# Patient Record
Sex: FEMALE | Race: WHITE | NOT HISPANIC OR LATINO | Employment: OTHER | URBAN - NONMETROPOLITAN AREA
[De-identification: names, ages, dates, MRNs, and addresses within clinical notes are randomized per-mention and may not be internally consistent; named-entity substitution may affect disease eponyms.]

---

## 2017-01-18 ENCOUNTER — OFFICE VISIT (OUTPATIENT)
Dept: FAMILY MEDICINE CLINIC | Facility: CLINIC | Age: 42
End: 2017-01-18

## 2017-01-18 VITALS
OXYGEN SATURATION: 100 % | RESPIRATION RATE: 16 BRPM | HEIGHT: 60 IN | HEART RATE: 87 BPM | BODY MASS INDEX: 39.27 KG/M2 | TEMPERATURE: 98.6 F | DIASTOLIC BLOOD PRESSURE: 92 MMHG | SYSTOLIC BLOOD PRESSURE: 133 MMHG | WEIGHT: 200 LBS

## 2017-01-18 DIAGNOSIS — Z86.32 HX OF GESTATIONAL DIABETES MELLITUS, NOT CURRENTLY PREGNANT: ICD-10-CM

## 2017-01-18 DIAGNOSIS — R53.82 CHRONIC FATIGUE: ICD-10-CM

## 2017-01-18 DIAGNOSIS — D50.9 IRON DEFICIENCY ANEMIA, UNSPECIFIED IRON DEFICIENCY ANEMIA TYPE: Primary | ICD-10-CM

## 2017-01-18 DIAGNOSIS — Z13.1 ENCOUNTER FOR SCREENING EXAMINATION FOR IMPAIRED GLUCOSE REGULATION AND DIABETES MELLITUS: ICD-10-CM

## 2017-01-18 DIAGNOSIS — Z00.00 ROUTINE GENERAL MEDICAL EXAMINATION AT A HEALTH CARE FACILITY: ICD-10-CM

## 2017-01-18 PROCEDURE — 99213 OFFICE O/P EST LOW 20 MIN: CPT | Performed by: INTERNAL MEDICINE

## 2017-01-18 RX ORDER — FLUTICASONE PROPIONATE 50 MCG
SPRAY, SUSPENSION (ML) NASAL DAILY
COMMUNITY
Start: 2015-10-01 | End: 2021-04-22

## 2017-01-18 RX ORDER — CETIRIZINE HYDROCHLORIDE 10 MG/1
TABLET ORAL DAILY
COMMUNITY
Start: 2014-12-08 | End: 2021-04-22

## 2017-01-18 RX ORDER — MONTELUKAST SODIUM 10 MG/1
TABLET ORAL DAILY
COMMUNITY
Start: 2015-04-13 | End: 2019-07-22

## 2017-01-18 NOTE — MR AVS SNAPSHOT
Sunitha Gregory   2017 11:30 AM   Office Visit    Dept Phone:  [phone]   Encounter #:  [id number]    Provider:  Quinten Pal MD   Department:  [facility]                Your Full Care Plan              Your Updated Medication List          This list is accurate as of: 17 12:19 PM.  Always use your most recent med list.                cetirizine 10 MG tablet   Commonly known as:  zyrTEC       FLONASE 50 MCG/ACT nasal spray   Generic drug:  fluticasone       montelukast 10 MG tablet   Commonly known as:  SINGULAIR       venlafaxine  MG 24 hr capsule   Commonly known as:  EFFEXOR-XR   Take 1 capsule by mouth Daily.               You Were Diagnosed With        Codes Comments    Iron deficiency anemia, unspecified iron deficiency anemia type    -  Primary ICD-10-CM: D50.9  ICD-9-CM: 280.9     Chronic fatigue     ICD-10-CM: R53.82  ICD-9-CM: 780.79     Routine general medical examination at a health care facility     ICD-10-CM: Z00.00  ICD-9-CM: V70.0     Gestational diabetes mellitus (GDM), antepartum, gestational diabetes method of control unspecified     ICD-10-CM: O24.419  ICD-9-CM: 648.83       Instructions     None    Patient Instructions History      Upcoming Appointments     Visit Type Date Time Department    OFFICE VISIT 2017 11:30 AM [facility]    OFFICE VISIT 2017  3:15 PM [facility]      [employer] Signup     [facility] [employer] allows you to send messages to your doctor, view your test results, renew your prescriptions, schedule appointments, and more. To sign up, go to [employer] and click on the Sign Up Now link in the New User? box. Enter your [employer] Activation Code exactly as it appears below along with the last four digits of your Social Security Number and your Date of Birth () to complete the sign-up process. If you do not sign up before the expiration date, you must request a  "new code.    MyChart Activation Code: [email or web address]  Expires: 2/1/2017 12:18 PM    If you have questions, you can email [email or web address] or call [phone] to talk to our [employer] staff. Remember, MyChart is NOT to be used for urgent needs. For medical emergencies, dial 911.               Other Info from Your Visit           Your Appointments     Feb 22, 2017  3:15 PM EST   Office Visit with Quinten Pal MD   [facility] (--)    [address] [place] [id number] [phone]           Arrive 15 minutes prior to appointment.              Allergies     Other      Actifed Cold/Sinus TABS      Reason for Visit     Nasal Congestion x 5 to 6 days    Right Ear Pain x 2 days    Sore Throat x 5 days    Cough coughing x 5 days      Vital Signs     Blood Pressure Pulse Temperature Respirations Height Weight    133/92 (BP Location: Left arm, Patient Position: Sitting, Cuff Size: Large Adult) 87 98.6 °F (37 °C) 16 60\" (152.4 cm) 200 lb (90.7 kg)    Oxygen Saturation Body Mass Index Smoking Status             100% 39.06 kg/m2 Former Smoker         Problems and Diagnoses Noted     Iron deficiency anemia    -  Primary    Chronic fatigue        Routine medical exam        Diabetes mellitus arising in pregnancy          No Longer an Issue     Intrauterine pregnancy        "

## 2017-01-18 NOTE — PROGRESS NOTES
"Subjective   Patient ID: Sunitha Gregory is a 41 y.o. female Pt is here for management of multiple medical problems.  History of Present Illness  Pt is here for management of multiple medical problems.    Pain in right ear and sinus pressure and pain. + sick contact.  Hx of AR. On 3 meds for AR.  No muscinex.     Stopped breast feeding in Sept.     The following portions of the patient's history were reviewed and updated as appropriate: allergies, current medications, past family history, past medical history, past social history, past surgical history and problem list.  Review of Systems   Constitutional: Positive for fatigue.   HENT: Positive for congestion, sinus pressure and sore throat.    All other systems reviewed and are negative.      Objective     Visit Vitals   • /92 (BP Location: Left arm, Patient Position: Sitting, Cuff Size: Large Adult)   • Pulse 87   • Temp 98.6 °F (37 °C)   • Resp 16   • Ht 60\" (152.4 cm)   • Wt 200 lb (90.7 kg)   • SpO2 100%   • BMI 39.06 kg/m2     Physical Exam  General Appearance:    Alert, cooperative, no distress, appears stated age   Head:    Normocephalic, without obvious abnormality, atraumatic   Eyes:    PERRL, conjunctiva/corneas clear, EOM's intact           Ears:    Normal TM's and external ear canals, both ears   Nose:   Nares normal, septum midline, mucosa normal, no drainage    or sinus tenderness   Throat:   Lips, mucosa, and tongue normal; teeth and gums normal   Neck:   Supple, symmetrical, trachea midline, no adenopathy;        thyroid:  No enlargement/tenderness/nodules; no carotid    bruit or JVD   Back:     Symmetric, no curvature, ROM normal, no CVA tenderness   Lungs:     Clear to auscultation bilaterally, respirations unlabored   Chest wall:    No tenderness or deformity   Heart:    Regular rate and rhythm, S1 and S2 normal, no murmur, rub   or gallop   Abdomen:     Soft, non-tender, bowel sounds active all four quadrants,     no masses, no organomegaly "           Extremities:   Extremities normal, atraumatic, no cyanosis or edema   Pulses:   2+ and symmetric all extremities   Skin:   Skin color, texture, turgor normal, no rashes or lesions   Lymph nodes:   Cervical, supraclavicular, and axillary nodes normal   Neurologic:   CNII-XII intact. Normal strength, sensation and reflexes       throughout       Assessment/Plan           Sunitha was seen today for nasal congestion, right ear pain, sore throat and cough.    Diagnoses and all orders for this visit:    Iron deficiency anemia, unspecified iron deficiency anemia type  -     TSH; Future  -     T4, Free; Future  -     Vitamin B12; Future  -     Comprehensive Metabolic Panel; Future  -     CBC & Differential; Future  -     Lipid Panel; Future  -     Iron Profile; Future  -     Hemoglobin A1c; Future    Chronic fatigue  -     TSH; Future  -     T4, Free; Future  -     Vitamin B12; Future  -     Comprehensive Metabolic Panel; Future  -     CBC & Differential; Future  -     Lipid Panel; Future  -     Iron Profile; Future  -     Hemoglobin A1c; Future    Routine general medical examination at a health care facility  -     TSH; Future  -     T4, Free; Future  -     Vitamin B12; Future  -     Comprehensive Metabolic Panel; Future  -     CBC & Differential; Future  -     Lipid Panel; Future  -     Iron Profile; Future  -     Hemoglobin A1c; Future    Hx of gestational diabetes mellitus, not currently pregnant  -     TSH; Future  -     T4, Free; Future  -     Vitamin B12; Future  -     Comprehensive Metabolic Panel; Future  -     CBC & Differential; Future  -     Lipid Panel; Future  -     Iron Profile; Future  -     Hemoglobin A1c; Future    Encounter for screening examination for impaired glucose regulation and diabetes mellitus      Return in about 4 weeks (around 2/15/2017).                   There are no Patient Instructions on file for this visit.

## 2017-03-10 ENCOUNTER — TELEPHONE (OUTPATIENT)
Dept: FAMILY MEDICINE CLINIC | Facility: CLINIC | Age: 42
End: 2017-03-10

## 2017-03-10 RX ORDER — OSELTAMIVIR PHOSPHATE 75 MG/1
75 CAPSULE ORAL 2 TIMES DAILY
Qty: 10 CAPSULE | Refills: 0 | Status: SHIPPED | OUTPATIENT
Start: 2017-03-10 | End: 2019-06-06

## 2017-03-10 NOTE — TELEPHONE ENCOUNTER
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 Patient has not been tested, but feels fatigued. Patient does not get off work till 6 tonight. Patient wants to know if Tamiflu could be called for precaution. Patient stated she has a 18 month old she does not want to expose this too as well. Patient uses [facility]. Patient stated she is not allergic to anything that she is aware of. Patient will be in meeting all day so her  Sumanth can be reached at [phone].

## 2017-05-10 RX ORDER — VENLAFAXINE HYDROCHLORIDE 150 MG/1
CAPSULE, EXTENDED RELEASE ORAL
Qty: 30 CAPSULE | Refills: 1 | Status: SHIPPED | OUTPATIENT
Start: 2017-05-10 | End: 2019-06-06 | Stop reason: SDDI

## 2017-05-10 RX ORDER — VENLAFAXINE HYDROCHLORIDE 150 MG/1
150 CAPSULE, EXTENDED RELEASE ORAL DAILY
Qty: 90 CAPSULE | Refills: 9 | Status: SHIPPED | OUTPATIENT
Start: 2017-05-10 | End: 2019-06-06

## 2019-06-06 ENCOUNTER — TELEPHONE (OUTPATIENT)
Dept: INTERNAL MEDICINE | Facility: CLINIC | Age: 44
End: 2019-06-06

## 2019-06-06 ENCOUNTER — OFFICE VISIT (OUTPATIENT)
Dept: INTERNAL MEDICINE | Facility: CLINIC | Age: 44
End: 2019-06-06

## 2019-06-06 VITALS
BODY MASS INDEX: 37.18 KG/M2 | OXYGEN SATURATION: 98 % | HEART RATE: 78 BPM | SYSTOLIC BLOOD PRESSURE: 128 MMHG | WEIGHT: 189.4 LBS | HEIGHT: 60 IN | DIASTOLIC BLOOD PRESSURE: 68 MMHG | TEMPERATURE: 98.4 F

## 2019-06-06 DIAGNOSIS — J02.9 PHARYNGITIS, UNSPECIFIED ETIOLOGY: Primary | ICD-10-CM

## 2019-06-06 DIAGNOSIS — R05.9 COUGH: ICD-10-CM

## 2019-06-06 LAB
EXPIRATION DATE: NORMAL
INTERNAL CONTROL: NORMAL
Lab: NORMAL
S PYO RRNA THROAT QL PROBE: NEGATIVE

## 2019-06-06 PROCEDURE — 99213 OFFICE O/P EST LOW 20 MIN: CPT | Performed by: FAMILY MEDICINE

## 2019-06-06 PROCEDURE — 87651 STREP A DNA AMP PROBE: CPT | Performed by: FAMILY MEDICINE

## 2019-06-06 RX ORDER — AMOXICILLIN AND CLAVULANATE POTASSIUM 875; 125 MG/1; MG/1
1 TABLET, FILM COATED ORAL 2 TIMES DAILY
Qty: 20 TABLET | Refills: 0 | Status: SHIPPED | OUTPATIENT
Start: 2019-06-06 | End: 2019-07-22

## 2019-06-06 RX ORDER — MULTIVITAMIN
TABLET ORAL DAILY
COMMUNITY
Start: 2014-12-08

## 2019-06-06 RX ORDER — FLUCONAZOLE 150 MG/1
150 TABLET ORAL ONCE
Qty: 1 TABLET | Refills: 0 | Status: SHIPPED | OUTPATIENT
Start: 2019-06-06 | End: 2019-06-06

## 2019-06-06 NOTE — PROGRESS NOTES
Sunitha Gregory is a 43 y.o. female.    Chief Complaint   Patient presents with   • Cough     x 4 days    • URI       HPI   Patient reports they have not been feeling well for 4day(s). She admits to sore throat, cough.  She denies rhinorrhea, nasal congestion, facial pain, ear pain, headache.  They have tried alkaselzer, honey, bourbon, essential oils for this issue without good response.    The following portions of the patient's history were reviewed and updated as appropriate: allergies, current medications, past family history, past medical history, past social history, past surgical history and problem list.     Allergies   Allergen Reactions   • Other      Actifed Cold/Sinus TABS           Current Outpatient Medications:   •  cetirizine (zyrTEC) 10 MG tablet, Take  by mouth Daily., Disp: , Rfl:   •  fluticasone (FLONASE) 50 MCG/ACT nasal spray, into each nostril Daily., Disp: , Rfl:   •  Multiple Vitamin (MULTI-VITAMIN DAILY) tablet, Take  by mouth Daily., Disp: , Rfl:   •  amoxicillin-clavulanate (AUGMENTIN) 875-125 MG per tablet, Take 1 tablet by mouth 2 (Two) Times a Day., Disp: 20 tablet, Rfl: 0  •  montelukast (SINGULAIR) 10 MG tablet, Take  by mouth Daily., Disp: , Rfl:     ROS    Review of Systems   Constitutional: Negative for chills, fatigue and fever.   HENT: Positive for sore throat. Negative for congestion and postnasal drip.    Respiratory: Positive for choking. Negative for cough, shortness of breath and wheezing.    Cardiovascular: Negative for chest pain and leg swelling.   Gastrointestinal: Negative for abdominal pain, constipation, diarrhea, nausea and vomiting.   Musculoskeletal: Negative for arthralgias and back pain.   Neurological: Negative for headache.       Vitals:    06/06/19 1049   BP: 128/68   BP Location: Left arm   Patient Position: Sitting   Cuff Size: Adult   Pulse: 78   Temp: 98.4 °F (36.9 °C)   TempSrc: Temporal   SpO2: 98%   Weight: 85.9 kg (189 lb 6.4 oz)   Height: 152.4 cm  "(60\")     Body mass index is 36.99 kg/m².    Physical Exam     Physical Exam   Constitutional: She is oriented to person, place, and time. She appears well-developed and well-nourished. No distress.   HENT:   Head: Normocephalic and atraumatic.   Right Ear: Tympanic membrane and external ear normal.   Left Ear: Tympanic membrane and external ear normal.   Mouth/Throat: Posterior oropharyngeal erythema present.   Eyes: Conjunctivae and EOM are normal. Pupils are equal, round, and reactive to light.   Neck: Normal range of motion. Neck supple.   Cardiovascular: Normal rate and regular rhythm.   No murmur heard.  Pulmonary/Chest: Effort normal and breath sounds normal. No respiratory distress. She has no wheezes.   Abdominal: Soft. Bowel sounds are normal. She exhibits no distension. There is no tenderness.   Lymphadenopathy:     She has no cervical adenopathy.   Neurological: She is alert and oriented to person, place, and time. No cranial nerve deficit.   Skin: Skin is warm and dry.   Psychiatric: She has a normal mood and affect. Her behavior is normal.       Assessment/Plan    Problem List Items Addressed This Visit        Respiratory    Pharyngitis - Primary     Will treat with augmentin.  Patient encouraged to continue current allergy medications for cough.          Relevant Medications    amoxicillin-clavulanate (AUGMENTIN) 875-125 MG per tablet    Other Relevant Orders    POCT Strep A, molecular (Completed)      Other Visit Diagnoses     Cough        Relevant Orders    POCT Strep A, molecular (Completed)          New Medications Ordered This Visit   Medications   • amoxicillin-clavulanate (AUGMENTIN) 875-125 MG per tablet     Sig: Take 1 tablet by mouth 2 (Two) Times a Day.     Dispense:  20 tablet     Refill:  0   • fluconazole (DIFLUCAN) 150 MG tablet     Sig: Take 1 tablet by mouth 1 (One) Time for 1 dose.     Dispense:  1 tablet     Refill:  0       No orders of the defined types were placed in this " encounter.      Return if symptoms worsen or fail to improve.    Aliyah Cardoza, DO

## 2019-06-06 NOTE — TELEPHONE ENCOUNTER
Lab orders faxed to [facility] and patient has been notified. I left a message on her phone.

## 2019-06-06 NOTE — TELEPHONE ENCOUNTER
Patient has labs in system from 2017 but needs them to be reordered. She would like a call when that is done so she can come in and discuss with lexie.

## 2019-06-11 NOTE — ASSESSMENT & PLAN NOTE
Will treat with augmentin.  Patient encouraged to continue current allergy medications for cough.

## 2019-06-26 ENCOUNTER — TELEPHONE (OUTPATIENT)
Dept: INTERNAL MEDICINE | Facility: CLINIC | Age: 44
End: 2019-06-26

## 2019-06-26 RX ORDER — FLUCONAZOLE 100 MG/1
100 TABLET ORAL DAILY
Qty: 7 TABLET | Refills: 0 | Status: SHIPPED | OUTPATIENT
Start: 2019-06-26 | End: 2019-07-03

## 2019-06-26 NOTE — TELEPHONE ENCOUNTER
Dr. Cardoza, you saw this patient (Sunitha)  on 6/6/19 for Pharyngitis and prescribed  Augmentin and gave her Diflucan, patient states 5 days ago she developed a yeast infection, she states she took the Diflucan 3 days ago but the yeast infection has gotten much worse. Sunitha states she is miserable and needs something else prescribed for the yeast infection.

## 2019-07-11 LAB
ALBUMIN SERPL-MCNC: 4.1 G/DL (ref 3.5–5)
ALBUMIN/GLOB SERPL: 1.2 G/DL (ref 1–2)
ALP SERPL-CCNC: 74 U/L (ref 38–126)
ALT SERPL-CCNC: 29 U/L (ref 13–69)
AST SERPL-CCNC: 27 U/L (ref 15–46)
BASOPHILS # BLD AUTO: 0.05 10*3/MM3 (ref 0–0.2)
BASOPHILS NFR BLD AUTO: 0.8 % (ref 0–1.5)
BILIRUB SERPL-MCNC: 1 MG/DL (ref 0.2–1.3)
BUN SERPL-MCNC: 12 MG/DL (ref 7–20)
BUN/CREAT SERPL: 15 (ref 7.1–23.5)
CALCIUM SERPL-MCNC: 9.2 MG/DL (ref 8.4–10.2)
CHLORIDE SERPL-SCNC: 105 MMOL/L (ref 98–107)
CHOLEST SERPL-MCNC: 212 MG/DL (ref 0–199)
CO2 SERPL-SCNC: 23 MMOL/L (ref 26–30)
CREAT SERPL-MCNC: 0.8 MG/DL (ref 0.6–1.3)
EOSINOPHIL # BLD AUTO: 0.28 10*3/MM3 (ref 0–0.4)
EOSINOPHIL NFR BLD AUTO: 4.5 % (ref 0.3–6.2)
ERYTHROCYTE [DISTWIDTH] IN BLOOD BY AUTOMATED COUNT: 15.9 % (ref 12.3–15.4)
GLOBULIN SER CALC-MCNC: 3.4 GM/DL
GLUCOSE SERPL-MCNC: 85 MG/DL (ref 74–98)
HBA1C MFR BLD: 5.4 % (ref 4.8–5.6)
HCT VFR BLD AUTO: 37.5 % (ref 34–46.6)
HDLC SERPL-MCNC: 56 MG/DL (ref 40–60)
HGB BLD-MCNC: 11.7 G/DL (ref 12–15.9)
IMM GRANULOCYTES # BLD AUTO: 0.02 10*3/MM3 (ref 0–0.05)
IMM GRANULOCYTES NFR BLD AUTO: 0.3 % (ref 0–0.5)
IRON SATN MFR SERPL: 25 % (ref 11–46)
IRON SERPL-MCNC: 101 MCG/DL (ref 37–181)
LDLC SERPL CALC-MCNC: 136 MG/DL (ref 0–99)
LYMPHOCYTES # BLD AUTO: 1.78 10*3/MM3 (ref 0.7–3.1)
LYMPHOCYTES NFR BLD AUTO: 28.5 % (ref 19.6–45.3)
MCH RBC QN AUTO: 26.3 PG (ref 26.6–33)
MCHC RBC AUTO-ENTMCNC: 31.2 G/DL (ref 31.5–35.7)
MCV RBC AUTO: 84.3 FL (ref 79–97)
MONOCYTES # BLD AUTO: 0.64 10*3/MM3 (ref 0.1–0.9)
MONOCYTES NFR BLD AUTO: 10.3 % (ref 5–12)
NEUTROPHILS # BLD AUTO: 3.47 10*3/MM3 (ref 1.7–7)
NEUTROPHILS NFR BLD AUTO: 55.6 % (ref 42.7–76)
NRBC BLD AUTO-RTO: 0 /100 WBC (ref 0–0.2)
PLATELET # BLD AUTO: 413 10*3/MM3 (ref 140–450)
POTASSIUM SERPL-SCNC: 4.3 MMOL/L (ref 3.5–5.1)
PROT SERPL-MCNC: 7.5 G/DL (ref 6.3–8.2)
RBC # BLD AUTO: 4.45 10*6/MM3 (ref 3.77–5.28)
SODIUM SERPL-SCNC: 138 MMOL/L (ref 137–145)
T4 FREE SERPL-MCNC: 1.01 NG/DL (ref 0.78–2.19)
TIBC SERPL-MCNC: 408 MCG/DL (ref 261–497)
TRIGL SERPL-MCNC: 100 MG/DL
TSH SERPL DL<=0.005 MIU/L-ACNC: 3.95 MIU/ML (ref 0.47–4.68)
UIBC SERPL-MCNC: 308 MCG/DL (ref 112–346)
VIT B12 SERPL-MCNC: 472 PG/ML (ref 239–931)
VLDLC SERPL CALC-MCNC: 20 MG/DL
WBC # BLD AUTO: 6.24 10*3/MM3 (ref 3.4–10.8)

## 2019-07-22 ENCOUNTER — OFFICE VISIT (OUTPATIENT)
Dept: INTERNAL MEDICINE | Facility: CLINIC | Age: 44
End: 2019-07-22

## 2019-07-22 VITALS
SYSTOLIC BLOOD PRESSURE: 146 MMHG | OXYGEN SATURATION: 99 % | TEMPERATURE: 98.2 F | BODY MASS INDEX: 33.53 KG/M2 | HEIGHT: 60 IN | RESPIRATION RATE: 18 BRPM | HEART RATE: 91 BPM | WEIGHT: 170.8 LBS | DIASTOLIC BLOOD PRESSURE: 98 MMHG

## 2019-07-22 DIAGNOSIS — J45.30 MILD PERSISTENT REACTIVE AIRWAY DISEASE WITHOUT COMPLICATION: ICD-10-CM

## 2019-07-22 DIAGNOSIS — Z00.00 ROUTINE GENERAL MEDICAL EXAMINATION AT A HEALTH CARE FACILITY: ICD-10-CM

## 2019-07-22 DIAGNOSIS — I10 ESSENTIAL HYPERTENSION: Primary | ICD-10-CM

## 2019-07-22 PROCEDURE — 99396 PREV VISIT EST AGE 40-64: CPT | Performed by: INTERNAL MEDICINE

## 2019-07-22 RX ORDER — ALBUTEROL SULFATE 90 UG/1
2 AEROSOL, METERED RESPIRATORY (INHALATION) EVERY 4 HOURS PRN
Qty: 18 G | Refills: 1 | Status: SHIPPED | OUTPATIENT
Start: 2019-07-22 | End: 2020-08-25 | Stop reason: SDUPTHER

## 2019-07-22 RX ORDER — HYDROCHLOROTHIAZIDE 12.5 MG/1
12.5 TABLET ORAL DAILY
Qty: 30 TABLET | Refills: 11 | Status: SHIPPED | OUTPATIENT
Start: 2019-07-22 | End: 2020-08-03

## 2019-07-22 NOTE — PROGRESS NOTES
"Subjective     Patient ID: Sunitha Gregory is a 44 y.o. female. Patient is here for management of multiple medical problems.     Chief Complaint   Patient presents with   • Follow-up     4 MO F/U for Anxiety, Pharyngitis.     History of Present Illness       Annual exam today      Feels well.   Mild anxiety with MD office. No anxiety now and off Anxiety meds. Now on CBD oil.    RLS better with CBD cradum.  slepe is good at night  Down 35 lb in 1 year.   Eating better and exercise going well.  2 soda's in 6 months.              The following portions of the patient's history were reviewed and updated as appropriate: allergies, current medications, past family history, past medical history, past social history, past surgical history and problem list.    Review of Systems   Constitutional: Positive for fatigue.   Respiratory: Negative for shortness of breath, wheezing and stridor.    Cardiovascular: Negative for chest pain, palpitations and leg swelling.   Musculoskeletal: Negative for arthralgias, back pain and gait problem.   Psychiatric/Behavioral: Negative for sleep disturbance. The patient is not hyperactive.    All other systems reviewed and are negative.      Current Outpatient Medications:   •  cetirizine (zyrTEC) 10 MG tablet, Take  by mouth Daily., Disp: , Rfl:   •  fluticasone (FLONASE) 50 MCG/ACT nasal spray, into each nostril Daily., Disp: , Rfl:   •  Multiple Vitamin (MULTI-VITAMIN DAILY) tablet, Take  by mouth Daily., Disp: , Rfl:   •  albuterol sulfate  (90 Base) MCG/ACT inhaler, Inhale 2 puffs Every 4 (Four) Hours As Needed for Wheezing or Shortness of Air., Disp: 18 g, Rfl: 1  •  hydrochlorothiazide (HYDRODIURIL) 12.5 MG tablet, Take 1 tablet by mouth Daily., Disp: 30 tablet, Rfl: 11    Objective      Blood pressure 146/98, pulse 91, temperature 98.2 °F (36.8 °C), temperature source Temporal, resp. rate 18, height 152.4 cm (60\"), weight 77.5 kg (170 lb 12.8 oz), SpO2 99 %.    Physical " Exam     General Appearance:    Alert, cooperative, no distress, appears stated age   Head:    Normocephalic, without obvious abnormality, atraumatic   Eyes:    PERRL, conjunctiva/corneas clear, EOM's intact   Ears:    Normal TM's and external ear canals, both ears   Nose:   Nares normal, septum midline, mucosa normal, no drainage   or sinus tenderness   Throat:   Lips, mucosa, and tongue normal; teeth and gums normal   Neck:   Supple, symmetrical, trachea midline, no adenopathy;        thyroid:  No enlargement/tenderness/nodules; no carotid    bruit or JVD   Back:     Symmetric, no curvature, ROM normal, no CVA tenderness   Lungs:     Clear to auscultation bilaterally, respirations unlabored   Chest wall:    No tenderness or deformity   Heart:    Regular rate and rhythm, S1 and S2 normal, no murmur,        rub or gallop   Abdomen:     Soft, non-tender, bowel sounds active all four quadrants,     no masses, no organomegaly   Extremities:   Extremities normal, atraumatic, no cyanosis or edema   Pulses:   2+ and symmetric all extremities   Skin:   Skin color, texture, turgor normal, no rashes or lesions   Lymph nodes:   Cervical, supraclavicular, and axillary nodes normal   Neurologic:   CNII-XII intact. Normal strength, sensation and reflexes       throughout      Results for orders placed or performed in visit on 07/11/19   Comprehensive Metabolic Panel   Result Value Ref Range    Glucose 85 74 - 98 mg/dL    BUN 12 7 - 20 mg/dL    Creatinine 0.80 0.60 - 1.30 mg/dL    eGFR Non African Am 78 >60 mL/min/1.73    eGFR African Am 94 >60 mL/min/1.73    BUN/Creatinine Ratio 15.0 7.1 - 23.5    Sodium 138 137 - 145 mmol/L    Potassium 4.3 3.5 - 5.1 mmol/L    Chloride 105 98 - 107 mmol/L    Total CO2 23.0 (L) 26.0 - 30.0 mmol/L    Calcium 9.2 8.4 - 10.2 mg/dL    Total Protein 7.5 6.3 - 8.2 g/dL    Albumin 4.10 3.50 - 5.00 g/dL    Globulin 3.4 gm/dL    A/G Ratio 1.2 1.0 - 2.0 g/dL    Total Bilirubin 1.0 0.2 - 1.3 mg/dL     Alkaline Phosphatase 74 38 - 126 U/L    AST (SGOT) 27 15 - 46 U/L    ALT (SGPT) 29 13 - 69 U/L   Lipid Panel   Result Value Ref Range    Total Cholesterol 212 (H) 0 - 199 mg/dL    Triglycerides 100 <150 mg/dL    HDL Cholesterol 56 40 - 60 mg/dL    VLDL Cholesterol 20 mg/dL    LDL Cholesterol  136 (H) 0 - 99 mg/dL   Iron Profile   Result Value Ref Range    TIBC 408 261 - 497 mcg/dL    UIBC 308 112 - 346 mcg/dL    Iron 101 37 - 181 mcg/dL    Iron Saturation 25 11 - 46 %   Hemoglobin A1c   Result Value Ref Range    Hemoglobin A1C 5.40 4.80 - 5.60 %   T4, Free   Result Value Ref Range    Free T4 1.01 0.78 - 2.19 ng/dL   TSH   Result Value Ref Range    TSH 3.950 0.470 - 4.680 mIU/mL   Vitamin B12   Result Value Ref Range    Vitamin B-12 472 239 - 931 pg/mL   CBC & Differential   Result Value Ref Range    WBC 6.24 3.40 - 10.80 10*3/mm3    RBC 4.45 3.77 - 5.28 10*6/mm3    Hemoglobin 11.7 (L) 12.0 - 15.9 g/dL    Hematocrit 37.5 34.0 - 46.6 %    MCV 84.3 79.0 - 97.0 fL    MCH 26.3 (L) 26.6 - 33.0 pg    MCHC 31.2 (L) 31.5 - 35.7 g/dL    RDW 15.9 (H) 12.3 - 15.4 %    Platelets 413 140 - 450 10*3/mm3    Neutrophil Rel % 55.6 42.7 - 76.0 %    Lymphocyte Rel % 28.5 19.6 - 45.3 %    Monocyte Rel % 10.3 5.0 - 12.0 %    Eosinophil Rel % 4.5 0.3 - 6.2 %    Basophil Rel % 0.8 0.0 - 1.5 %    Neutrophils Absolute 3.47 1.70 - 7.00 10*3/mm3    Lymphocytes Absolute 1.78 0.70 - 3.10 10*3/mm3    Monocytes Absolute 0.64 0.10 - 0.90 10*3/mm3    Eosinophils Absolute 0.28 0.00 - 0.40 10*3/mm3    Basophils Absolute 0.05 0.00 - 0.20 10*3/mm3    Immature Granulocyte Rel % 0.3 0.0 - 0.5 %    Immature Grans Absolute 0.02 0.00 - 0.05 10*3/mm3    nRBC 0.0 0.0 - 0.2 /100 WBC         Assessment/Plan       RLS better with CBD cradum.  slepe is good at night  Down 35 lb in 1 year.   Eating better and exercise going well.  2 soda's in 6 months.    Sunitha was seen today for follow-up.    Diagnoses and all orders for this visit:    Essential hypertension  -      hydrochlorothiazide (HYDRODIURIL) 12.5 MG tablet; Take 1 tablet by mouth Daily.  -     Basic Metabolic Panel    Routine general medical examination at a health care facility  -     Ambulatory Referral to Gynecology    Mild persistent reactive airway disease without complication  -     albuterol sulfate  (90 Base) MCG/ACT inhaler; Inhale 2 puffs Every 4 (Four) Hours As Needed for Wheezing or Shortness of Air.      Return in about 3 months (around 10/22/2019).          There are no Patient Instructions on file for this visit.     Quinten Pal MD    Assessment/Plan

## 2019-09-05 ENCOUNTER — TRANSCRIBE ORDERS (OUTPATIENT)
Dept: MAMMOGRAPHY | Facility: HOSPITAL | Age: 44
End: 2019-09-05

## 2019-09-05 DIAGNOSIS — Z12.31 VISIT FOR SCREENING MAMMOGRAM: Primary | ICD-10-CM

## 2019-09-17 ENCOUNTER — HOSPITAL ENCOUNTER (OUTPATIENT)
Dept: MAMMOGRAPHY | Facility: HOSPITAL | Age: 44
Discharge: HOME | End: 2019-09-17
Admitting: OBSTETRICS & GYNECOLOGY

## 2019-09-17 DIAGNOSIS — Z12.31 VISIT FOR SCREENING MAMMOGRAM: ICD-10-CM

## 2019-09-17 PROCEDURE — 77063 BREAST TOMOSYNTHESIS BI: CPT | Performed by: RADIOLOGY

## 2019-09-17 PROCEDURE — 77067 SCR MAMMO BI INCL CAD: CPT | Performed by: RADIOLOGY

## 2019-09-17 PROCEDURE — 77063 BREAST TOMOSYNTHESIS BI: CPT

## 2019-09-17 PROCEDURE — 77067 SCR MAMMO BI INCL CAD: CPT

## 2019-11-15 LAB
BUN SERPL-MCNC: 14 MG/DL (ref 6–20)
BUN/CREAT SERPL: 17.7 (ref 7–25)
CALCIUM SERPL-MCNC: 9.3 MG/DL (ref 8.6–10.5)
CHLORIDE SERPL-SCNC: 101 MMOL/L (ref 98–107)
CO2 SERPL-SCNC: 27.7 MMOL/L (ref 22–29)
CREAT SERPL-MCNC: 0.79 MG/DL (ref 0.57–1)
GLUCOSE SERPL-MCNC: 92 MG/DL (ref 65–99)
POTASSIUM SERPL-SCNC: 4 MMOL/L (ref 3.5–5.2)
SODIUM SERPL-SCNC: 139 MMOL/L (ref 136–145)

## 2019-11-20 ENCOUNTER — OFFICE VISIT (OUTPATIENT)
Dept: INTERNAL MEDICINE | Facility: CLINIC | Age: 44
End: 2019-11-20

## 2019-11-20 VITALS
SYSTOLIC BLOOD PRESSURE: 118 MMHG | DIASTOLIC BLOOD PRESSURE: 78 MMHG | TEMPERATURE: 98.6 F | HEART RATE: 81 BPM | OXYGEN SATURATION: 100 % | BODY MASS INDEX: 32.61 KG/M2 | HEIGHT: 60 IN | RESPIRATION RATE: 16 BRPM | WEIGHT: 166.12 LBS

## 2019-11-20 DIAGNOSIS — I10 ESSENTIAL HYPERTENSION: Primary | ICD-10-CM

## 2019-11-20 DIAGNOSIS — Z00.00 ROUTINE GENERAL MEDICAL EXAMINATION AT A HEALTH CARE FACILITY: ICD-10-CM

## 2019-11-20 PROCEDURE — 90472 IMMUNIZATION ADMIN EACH ADD: CPT | Performed by: INTERNAL MEDICINE

## 2019-11-20 PROCEDURE — 90715 TDAP VACCINE 7 YRS/> IM: CPT | Performed by: INTERNAL MEDICINE

## 2019-11-20 PROCEDURE — 90471 IMMUNIZATION ADMIN: CPT | Performed by: INTERNAL MEDICINE

## 2019-11-20 PROCEDURE — 99213 OFFICE O/P EST LOW 20 MIN: CPT | Performed by: INTERNAL MEDICINE

## 2019-11-20 PROCEDURE — 90674 CCIIV4 VAC NO PRSV 0.5 ML IM: CPT | Performed by: INTERNAL MEDICINE

## 2020-01-14 LAB
ALBUMIN SERPL-MCNC: 4.3 G/DL (ref 3.5–5.2)
ALBUMIN/GLOB SERPL: 1.5 G/DL
ALP SERPL-CCNC: 73 U/L (ref 39–117)
ALT SERPL-CCNC: 19 U/L (ref 1–33)
AST SERPL-CCNC: 20 U/L (ref 1–32)
BASOPHILS # BLD AUTO: 0.04 10*3/MM3 (ref 0–0.2)
BASOPHILS NFR BLD AUTO: 0.6 % (ref 0–1.5)
BILIRUB SERPL-MCNC: 0.5 MG/DL (ref 0.2–1.2)
BUN SERPL-MCNC: 13 MG/DL (ref 6–20)
BUN/CREAT SERPL: 13.7 (ref 7–25)
CALCIUM SERPL-MCNC: 9.1 MG/DL (ref 8.6–10.5)
CHLORIDE SERPL-SCNC: 100 MMOL/L (ref 98–107)
CHOLEST SERPL-MCNC: 198 MG/DL (ref 0–200)
CO2 SERPL-SCNC: 26.4 MMOL/L (ref 22–29)
CREAT SERPL-MCNC: 0.95 MG/DL (ref 0.57–1)
EOSINOPHIL # BLD AUTO: 0.42 10*3/MM3 (ref 0–0.4)
EOSINOPHIL NFR BLD AUTO: 6.5 % (ref 0.3–6.2)
ERYTHROCYTE [DISTWIDTH] IN BLOOD BY AUTOMATED COUNT: 13.7 % (ref 12.3–15.4)
GLOBULIN SER CALC-MCNC: 2.8 GM/DL
GLUCOSE SERPL-MCNC: 86 MG/DL (ref 65–99)
HCT VFR BLD AUTO: 36.3 % (ref 34–46.6)
HDLC SERPL-MCNC: 62 MG/DL (ref 40–60)
HGB BLD-MCNC: 12.2 G/DL (ref 12–15.9)
IMM GRANULOCYTES # BLD AUTO: 0.01 10*3/MM3 (ref 0–0.05)
IMM GRANULOCYTES NFR BLD AUTO: 0.2 % (ref 0–0.5)
LDLC SERPL CALC-MCNC: 119 MG/DL (ref 0–100)
LYMPHOCYTES # BLD AUTO: 1.62 10*3/MM3 (ref 0.7–3.1)
LYMPHOCYTES NFR BLD AUTO: 24.9 % (ref 19.6–45.3)
MCH RBC QN AUTO: 29.5 PG (ref 26.6–33)
MCHC RBC AUTO-ENTMCNC: 33.6 G/DL (ref 31.5–35.7)
MCV RBC AUTO: 87.7 FL (ref 79–97)
MONOCYTES # BLD AUTO: 0.58 10*3/MM3 (ref 0.1–0.9)
MONOCYTES NFR BLD AUTO: 8.9 % (ref 5–12)
NEUTROPHILS # BLD AUTO: 3.84 10*3/MM3 (ref 1.7–7)
NEUTROPHILS NFR BLD AUTO: 58.9 % (ref 42.7–76)
NRBC BLD AUTO-RTO: 0 /100 WBC (ref 0–0.2)
PLATELET # BLD AUTO: 382 10*3/MM3 (ref 140–450)
POTASSIUM SERPL-SCNC: 4.1 MMOL/L (ref 3.5–5.2)
PROT SERPL-MCNC: 7.1 G/DL (ref 6–8.5)
RBC # BLD AUTO: 4.14 10*6/MM3 (ref 3.77–5.28)
SODIUM SERPL-SCNC: 140 MMOL/L (ref 136–145)
T4 FREE SERPL-MCNC: 1.13 NG/DL (ref 0.93–1.7)
TRIGL SERPL-MCNC: 86 MG/DL (ref 0–150)
TSH SERPL DL<=0.005 MIU/L-ACNC: 2.92 UIU/ML (ref 0.27–4.2)
VIT B12 SERPL-MCNC: 730 PG/ML (ref 211–946)
VLDLC SERPL CALC-MCNC: 17.2 MG/DL
WBC # BLD AUTO: 6.51 10*3/MM3 (ref 3.4–10.8)

## 2020-01-17 ENCOUNTER — OFFICE VISIT (OUTPATIENT)
Dept: INTERNAL MEDICINE | Facility: CLINIC | Age: 45
End: 2020-01-17

## 2020-01-17 VITALS
OXYGEN SATURATION: 100 % | HEIGHT: 60 IN | HEART RATE: 92 BPM | BODY MASS INDEX: 33.79 KG/M2 | RESPIRATION RATE: 16 BRPM | DIASTOLIC BLOOD PRESSURE: 85 MMHG | TEMPERATURE: 98.2 F | WEIGHT: 172.12 LBS | SYSTOLIC BLOOD PRESSURE: 131 MMHG

## 2020-01-17 DIAGNOSIS — R53.83 FATIGUE, UNSPECIFIED TYPE: Primary | ICD-10-CM

## 2020-01-17 DIAGNOSIS — F41.9 ANXIETY: ICD-10-CM

## 2020-01-17 PROCEDURE — 99214 OFFICE O/P EST MOD 30 MIN: CPT | Performed by: INTERNAL MEDICINE

## 2020-01-17 RX ORDER — BUSPIRONE HYDROCHLORIDE 5 MG/1
5 TABLET ORAL 3 TIMES DAILY
Qty: 60 TABLET | Refills: 3 | Status: SHIPPED | OUTPATIENT
Start: 2020-01-17 | End: 2020-01-17

## 2020-01-17 RX ORDER — BUPROPION HYDROCHLORIDE 100 MG/1
100 TABLET, EXTENDED RELEASE ORAL 2 TIMES DAILY
Qty: 60 TABLET | Refills: 7 | Status: SHIPPED | OUTPATIENT
Start: 2020-01-17 | End: 2020-02-14 | Stop reason: SDUPTHER

## 2020-01-17 NOTE — PROGRESS NOTES
Subjective     Patient ID: Sunitha Gregory is a 44 y.o. female. Patient is here for management of multiple medical problems.     Chief Complaint   Patient presents with   • Hypothyroidism     follow-up, patient noticing more hair loss, and more weight gain     Hypothyroidism   This is a chronic problem. The current episode started more than 1 year ago. The problem occurs constantly. The problem has been unchanged. Pertinent negatives include no abdominal pain, anorexia, arthralgias, change in bowel habit, chest pain or fatigue. Nothing aggravates the symptoms. The treatment provided moderate relief.      Wt up a bit. Not exercising like she had. Less food.        Anxiety. worsening    Poor sleep.     The following portions of the patient's history were reviewed and updated as appropriate: allergies, current medications, past family history, past medical history, past social history, past surgical history and problem list.    Review of Systems   Constitutional: Negative for fatigue.   Cardiovascular: Negative for chest pain.   Gastrointestinal: Negative for abdominal pain, anorexia and change in bowel habit.   Musculoskeletal: Negative for arthralgias.   All other systems reviewed and are negative.      Current Outpatient Medications:   •  albuterol sulfate  (90 Base) MCG/ACT inhaler, Inhale 2 puffs Every 4 (Four) Hours As Needed for Wheezing or Shortness of Air., Disp: 18 g, Rfl: 1  •  BIOTIN PO, Take  by mouth Daily., Disp: , Rfl:   •  cetirizine (zyrTEC) 10 MG tablet, Take  by mouth Daily., Disp: , Rfl:   •  fluticasone (FLONASE) 50 MCG/ACT nasal spray, into each nostril Daily., Disp: , Rfl:   •  hydrochlorothiazide (HYDRODIURIL) 12.5 MG tablet, Take 1 tablet by mouth Daily., Disp: 30 tablet, Rfl: 11  •  Multiple Vitamin (MULTI-VITAMIN DAILY) tablet, Take  by mouth Daily., Disp: , Rfl:   •  buPROPion SR (WELLBUTRIN SR) 100 MG 12 hr tablet, Take 1 tablet by mouth 2 (Two) Times a Day., Disp: 60 tablet, Rfl:  "7    Objective      Blood pressure 131/85, pulse 92, temperature 98.2 °F (36.8 °C), temperature source Oral, resp. rate 16, height 152.4 cm (60\"), weight 78.1 kg (172 lb 1.9 oz), SpO2 100 %.    Physical Exam     General Appearance:    Alert, cooperative, no distress, appears stated age   Head:    Normocephalic, without obvious abnormality, atraumatic   Eyes:    PERRL, conjunctiva/corneas clear, EOM's intact   Ears:    Normal TM's and external ear canals, both ears   Nose:   Nares normal, septum midline, mucosa normal, no drainage   or sinus tenderness   Throat:   Lips, mucosa, and tongue normal; teeth and gums normal   Neck:   Supple, symmetrical, trachea midline, no adenopathy;        thyroid:  No enlargement/tenderness/nodules; no carotid    bruit or JVD   Back:     Symmetric, no curvature, ROM normal, no CVA tenderness   Lungs:     Clear to auscultation bilaterally, respirations unlabored   Chest wall:    No tenderness or deformity   Heart:    Regular rate and rhythm, S1 and S2 normal, no murmur,        rub or gallop   Abdomen:     Soft, non-tender, bowel sounds active all four quadrants,     no masses, no organomegaly   Extremities:   Extremities normal, atraumatic, no cyanosis or edema   Pulses:   2+ and symmetric all extremities   Skin:   Skin color, texture, turgor normal, no rashes or lesions   Lymph nodes:   Cervical, supraclavicular, and axillary nodes normal   Neurologic:   CNII-XII intact. Normal strength, sensation and reflexes       throughout      Results for orders placed or performed in visit on 11/20/19   TSH   Result Value Ref Range    TSH 2.920 0.270 - 4.200 uIU/mL   T4, Free   Result Value Ref Range    Free T4 1.13 0.93 - 1.70 ng/dL   Lipid Panel   Result Value Ref Range    Total Cholesterol 198 0 - 200 mg/dL    Triglycerides 86 0 - 150 mg/dL    HDL Cholesterol 62 (H) 40 - 60 mg/dL    VLDL Cholesterol 17.2 mg/dL    LDL Cholesterol  119 (H) 0 - 100 mg/dL   Comprehensive Metabolic Panel   Result " Value Ref Range    Glucose 86 65 - 99 mg/dL    BUN 13 6 - 20 mg/dL    Creatinine 0.95 0.57 - 1.00 mg/dL    eGFR Non African Am 64 >60 mL/min/1.73    eGFR African Am 77 >60 mL/min/1.73    BUN/Creatinine Ratio 13.7 7.0 - 25.0    Sodium 140 136 - 145 mmol/L    Potassium 4.1 3.5 - 5.2 mmol/L    Chloride 100 98 - 107 mmol/L    Total CO2 26.4 22.0 - 29.0 mmol/L    Calcium 9.1 8.6 - 10.5 mg/dL    Total Protein 7.1 6.0 - 8.5 g/dL    Albumin 4.30 3.50 - 5.20 g/dL    Globulin 2.8 gm/dL    A/G Ratio 1.5 g/dL    Total Bilirubin 0.5 0.2 - 1.2 mg/dL    Alkaline Phosphatase 73 39 - 117 U/L    AST (SGOT) 20 1 - 32 U/L    ALT (SGPT) 19 1 - 33 U/L   Vitamin B12   Result Value Ref Range    Vitamin B-12 730 211 - 946 pg/mL   CBC & Differential   Result Value Ref Range    WBC 6.51 3.40 - 10.80 10*3/mm3    RBC 4.14 3.77 - 5.28 10*6/mm3    Hemoglobin 12.2 12.0 - 15.9 g/dL    Hematocrit 36.3 34.0 - 46.6 %    MCV 87.7 79.0 - 97.0 fL    MCH 29.5 26.6 - 33.0 pg    MCHC 33.6 31.5 - 35.7 g/dL    RDW 13.7 12.3 - 15.4 %    Platelets 382 140 - 450 10*3/mm3    Neutrophil Rel % 58.9 42.7 - 76.0 %    Lymphocyte Rel % 24.9 19.6 - 45.3 %    Monocyte Rel % 8.9 5.0 - 12.0 %    Eosinophil Rel % 6.5 (H) 0.3 - 6.2 %    Basophil Rel % 0.6 0.0 - 1.5 %    Neutrophils Absolute 3.84 1.70 - 7.00 10*3/mm3    Lymphocytes Absolute 1.62 0.70 - 3.10 10*3/mm3    Monocytes Absolute 0.58 0.10 - 0.90 10*3/mm3    Eosinophils Absolute 0.42 (H) 0.00 - 0.40 10*3/mm3    Basophils Absolute 0.04 0.00 - 0.20 10*3/mm3    Immature Granulocyte Rel % 0.2 0.0 - 0.5 %    Immature Grans Absolute 0.01 0.00 - 0.05 10*3/mm3    nRBC 0.0 0.0 - 0.2 /100 WBC         Assessment/Plan       Pt with fatigue.  Wt gain.  No constipation  Thinning hair.          Sunitha was seen today for hypothyroidism.    Diagnoses and all orders for this visit:    Fatigue, unspecified type    Anxiety    Other orders  -     Discontinue: busPIRone (BUSPAR) 5 MG tablet; Take 1 tablet by mouth 3 (Three) Times a Day.  -      buPROPion SR (WELLBUTRIN SR) 100 MG 12 hr tablet; Take 1 tablet by mouth 2 (Two) Times a Day.      Return in about 4 weeks (around 2/14/2020).          There are no Patient Instructions on file for this visit.     Quinten Pal MD    Assessment/Plan

## 2020-02-14 ENCOUNTER — OFFICE VISIT (OUTPATIENT)
Dept: INTERNAL MEDICINE | Facility: CLINIC | Age: 45
End: 2020-02-14

## 2020-02-14 VITALS
DIASTOLIC BLOOD PRESSURE: 86 MMHG | OXYGEN SATURATION: 99 % | HEART RATE: 81 BPM | TEMPERATURE: 98.4 F | WEIGHT: 173.8 LBS | RESPIRATION RATE: 16 BRPM | SYSTOLIC BLOOD PRESSURE: 139 MMHG | BODY MASS INDEX: 34.12 KG/M2 | HEIGHT: 60 IN

## 2020-02-14 DIAGNOSIS — F43.0 ACUTE STRESS DISORDER: ICD-10-CM

## 2020-02-14 DIAGNOSIS — J45.909 MODERATE ASTHMA, UNSPECIFIED WHETHER COMPLICATED, UNSPECIFIED WHETHER PERSISTENT: Primary | ICD-10-CM

## 2020-02-14 DIAGNOSIS — F41.9 ANXIETY: ICD-10-CM

## 2020-02-14 DIAGNOSIS — F32.89 OTHER DEPRESSION: ICD-10-CM

## 2020-02-14 PROCEDURE — 99214 OFFICE O/P EST MOD 30 MIN: CPT | Performed by: INTERNAL MEDICINE

## 2020-02-14 RX ORDER — BUPROPION HYDROCHLORIDE 150 MG/1
150 TABLET, EXTENDED RELEASE ORAL 2 TIMES DAILY
Qty: 60 TABLET | Refills: 11 | Status: SHIPPED | OUTPATIENT
Start: 2020-02-14 | End: 2021-02-23

## 2020-02-14 RX ORDER — LEVOTHYROXINE SODIUM 0.05 MG/1
50 TABLET ORAL DAILY
Qty: 30 TABLET | Refills: 11 | Status: SHIPPED | OUTPATIENT
Start: 2020-02-14 | End: 2021-02-17

## 2020-02-14 NOTE — PROGRESS NOTES
"Subjective     Patient ID: Sunitha Gregory is a 44 y.o. female. Patient is here for management of multiple medical problems.     Chief Complaint   Patient presents with   • Fatigue     follow-up, patient states the Wellbutrin is not working for her     History of Present Illness       Wellbutrin not working    Sleep apnea in question. Snoring. No sig fatigue.        The following portions of the patient's history were reviewed and updated as appropriate: allergies, current medications, past family history, past medical history, past social history, past surgical history and problem list.    Review of Systems   Constitutional: Negative for diaphoresis and fatigue.   Psychiatric/Behavioral: Positive for decreased concentration, dysphoric mood and sleep disturbance.   All other systems reviewed and are negative.      Current Outpatient Medications:   •  albuterol sulfate  (90 Base) MCG/ACT inhaler, Inhale 2 puffs Every 4 (Four) Hours As Needed for Wheezing or Shortness of Air., Disp: 18 g, Rfl: 1  •  BIOTIN PO, Take  by mouth Daily., Disp: , Rfl:   •  buPROPion SR (WELLBUTRIN SR) 150 MG 12 hr tablet, Take 1 tablet by mouth 2 (Two) Times a Day., Disp: 60 tablet, Rfl: 11  •  cetirizine (zyrTEC) 10 MG tablet, Take  by mouth Daily., Disp: , Rfl:   •  fluticasone (FLONASE) 50 MCG/ACT nasal spray, into each nostril Daily., Disp: , Rfl:   •  hydrochlorothiazide (HYDRODIURIL) 12.5 MG tablet, Take 1 tablet by mouth Daily., Disp: 30 tablet, Rfl: 11  •  Multiple Vitamin (MULTI-VITAMIN DAILY) tablet, Take  by mouth Daily., Disp: , Rfl:   •  levothyroxine (SYNTHROID) 50 MCG tablet, Take 1 tablet by mouth Daily., Disp: 30 tablet, Rfl: 11    Objective      Blood pressure 139/86, pulse 81, temperature 98.4 °F (36.9 °C), temperature source Oral, resp. rate 16, height 152.4 cm (60\"), weight 78.8 kg (173 lb 12.8 oz), SpO2 99 %.    Physical Exam     General Appearance:    Alert, cooperative, no distress, appears stated age   Head:   "  Normocephalic, without obvious abnormality, atraumatic   Eyes:    PERRL, conjunctiva/corneas clear, EOM's intact   Ears:    Normal TM's and external ear canals, both ears   Nose:   Nares normal, septum midline, mucosa normal, no drainage   or sinus tenderness   Throat:   Lips, mucosa, and tongue normal; teeth and gums normal   Neck:   Supple, symmetrical, trachea midline, no adenopathy;        thyroid:  No enlargement/tenderness/nodules; no carotid    bruit or JVD   Back:     Symmetric, no curvature, ROM normal, no CVA tenderness   Lungs:     Clear to auscultation bilaterally, respirations unlabored   Chest wall:    No tenderness or deformity   Heart:    Regular rate and rhythm, S1 and S2 normal, no murmur,        rub or gallop   Abdomen:     Soft, non-tender, bowel sounds active all four quadrants,     no masses, no organomegaly   Extremities:   Extremities normal, atraumatic, no cyanosis or edema   Pulses:   2+ and symmetric all extremities   Skin:   Skin color, texture, turgor normal, no rashes or lesions   Lymph nodes:   Cervical, supraclavicular, and axillary nodes normal   Neurologic:   CNII-XII intact. Normal strength, sensation and reflexes       throughout      Results for orders placed or performed in visit on 11/20/19   TSH   Result Value Ref Range    TSH 2.920 0.270 - 4.200 uIU/mL   T4, Free   Result Value Ref Range    Free T4 1.13 0.93 - 1.70 ng/dL   Lipid Panel   Result Value Ref Range    Total Cholesterol 198 0 - 200 mg/dL    Triglycerides 86 0 - 150 mg/dL    HDL Cholesterol 62 (H) 40 - 60 mg/dL    VLDL Cholesterol 17.2 mg/dL    LDL Cholesterol  119 (H) 0 - 100 mg/dL   Comprehensive Metabolic Panel   Result Value Ref Range    Glucose 86 65 - 99 mg/dL    BUN 13 6 - 20 mg/dL    Creatinine 0.95 0.57 - 1.00 mg/dL    eGFR Non African Am 64 >60 mL/min/1.73    eGFR African Am 77 >60 mL/min/1.73    BUN/Creatinine Ratio 13.7 7.0 - 25.0    Sodium 140 136 - 145 mmol/L    Potassium 4.1 3.5 - 5.2 mmol/L     Chloride 100 98 - 107 mmol/L    Total CO2 26.4 22.0 - 29.0 mmol/L    Calcium 9.1 8.6 - 10.5 mg/dL    Total Protein 7.1 6.0 - 8.5 g/dL    Albumin 4.30 3.50 - 5.20 g/dL    Globulin 2.8 gm/dL    A/G Ratio 1.5 g/dL    Total Bilirubin 0.5 0.2 - 1.2 mg/dL    Alkaline Phosphatase 73 39 - 117 U/L    AST (SGOT) 20 1 - 32 U/L    ALT (SGPT) 19 1 - 33 U/L   Vitamin B12   Result Value Ref Range    Vitamin B-12 730 211 - 946 pg/mL   CBC & Differential   Result Value Ref Range    WBC 6.51 3.40 - 10.80 10*3/mm3    RBC 4.14 3.77 - 5.28 10*6/mm3    Hemoglobin 12.2 12.0 - 15.9 g/dL    Hematocrit 36.3 34.0 - 46.6 %    MCV 87.7 79.0 - 97.0 fL    MCH 29.5 26.6 - 33.0 pg    MCHC 33.6 31.5 - 35.7 g/dL    RDW 13.7 12.3 - 15.4 %    Platelets 382 140 - 450 10*3/mm3    Neutrophil Rel % 58.9 42.7 - 76.0 %    Lymphocyte Rel % 24.9 19.6 - 45.3 %    Monocyte Rel % 8.9 5.0 - 12.0 %    Eosinophil Rel % 6.5 (H) 0.3 - 6.2 %    Basophil Rel % 0.6 0.0 - 1.5 %    Neutrophils Absolute 3.84 1.70 - 7.00 10*3/mm3    Lymphocytes Absolute 1.62 0.70 - 3.10 10*3/mm3    Monocytes Absolute 0.58 0.10 - 0.90 10*3/mm3    Eosinophils Absolute 0.42 (H) 0.00 - 0.40 10*3/mm3    Basophils Absolute 0.04 0.00 - 0.20 10*3/mm3    Immature Granulocyte Rel % 0.2 0.0 - 0.5 %    Immature Grans Absolute 0.01 0.00 - 0.05 10*3/mm3    nRBC 0.0 0.0 - 0.2 /100 WBC         Assessment/Plan       Sunitha was seen today for fatigue.    Diagnoses and all orders for this visit:    Moderate asthma, unspecified whether complicated, unspecified whether persistent  -     Overnight Sleep Oximetry Study; Future    Other depression    Acute stress disorder    Anxiety  -     buPROPion SR (WELLBUTRIN SR) 150 MG 12 hr tablet; Take 1 tablet by mouth 2 (Two) Times a Day.    Other orders  -     levothyroxine (SYNTHROID) 50 MCG tablet; Take 1 tablet by mouth Daily.      Return in about 6 weeks (around 3/27/2020).          There are no Patient Instructions on file for this visit.     Quinten Pal,  MD    Assessment/Plan

## 2020-06-03 ENCOUNTER — TELEPHONE (OUTPATIENT)
Dept: INTERNAL MEDICINE | Facility: CLINIC | Age: 45
End: 2020-06-03

## 2020-06-03 NOTE — TELEPHONE ENCOUNTER
PATIENT DIDN'T FEEL COMFORTABLE COMING IN AT THIS TIME AND WOULD BE INTERESTED IN A PHONE VISIT IF DR. TAVERAS IS DOING THOSE, PLEASE CALL PATIENT AND ADVISE    CAROL PH: [phone]

## 2020-07-09 ENCOUNTER — TELEPHONE (OUTPATIENT)
Dept: INTERNAL MEDICINE | Facility: CLINIC | Age: 45
End: 2020-07-09

## 2020-07-09 NOTE — TELEPHONE ENCOUNTER
PT CALLED AND WANTED TO KNOW IF DR TAVERAS CAN PLACE LAB ORDERS IN HER CHART FOR HER THYROID AND CHOLESTEROL LEVELS; HER APPT ISNT UNTIL AUGUST; PLEASE ADVISE    CAROL: [phone]

## 2020-07-13 DIAGNOSIS — Z00.00 ROUTINE GENERAL MEDICAL EXAMINATION AT A HEALTH CARE FACILITY: ICD-10-CM

## 2020-07-13 DIAGNOSIS — E78.00 HYPERCHOLESTEREMIA: Primary | ICD-10-CM

## 2020-08-03 DIAGNOSIS — I10 ESSENTIAL HYPERTENSION: ICD-10-CM

## 2020-08-03 RX ORDER — HYDROCHLOROTHIAZIDE 12.5 MG/1
TABLET ORAL
Qty: 30 TABLET | Refills: 10 | Status: SHIPPED | OUTPATIENT
Start: 2020-08-03 | End: 2021-05-03

## 2020-08-19 ENCOUNTER — TELEPHONE (OUTPATIENT)
Dept: INTERNAL MEDICINE | Facility: CLINIC | Age: 45
End: 2020-08-19

## 2020-08-19 NOTE — TELEPHONE ENCOUNTER
Patient requested a call back. Patient stated she is going to have lab work done on 8/20/20 for Dr. Pal and would like to make sure that it includes a thyroid check.     Please call and advise. Patient call back [phone]

## 2020-08-21 LAB
ALBUMIN SERPL-MCNC: 4.6 G/DL (ref 3.5–5.2)
ALBUMIN/GLOB SERPL: 2.2 G/DL
ALP SERPL-CCNC: 59 U/L (ref 39–117)
ALT SERPL-CCNC: 19 U/L (ref 1–33)
AST SERPL-CCNC: 18 U/L (ref 1–32)
BASOPHILS # BLD AUTO: 0.05 10*3/MM3 (ref 0–0.2)
BASOPHILS NFR BLD AUTO: 0.8 % (ref 0–1.5)
BILIRUB SERPL-MCNC: 0.7 MG/DL (ref 0–1.2)
BUN SERPL-MCNC: 15 MG/DL (ref 6–20)
BUN/CREAT SERPL: 18.3 (ref 7–25)
CALCIUM SERPL-MCNC: 9 MG/DL (ref 8.6–10.5)
CHLORIDE SERPL-SCNC: 102 MMOL/L (ref 98–107)
CHOLEST SERPL-MCNC: 232 MG/DL (ref 0–200)
CO2 SERPL-SCNC: 25.9 MMOL/L (ref 22–29)
CREAT SERPL-MCNC: 0.82 MG/DL (ref 0.57–1)
EOSINOPHIL # BLD AUTO: 0.4 10*3/MM3 (ref 0–0.4)
EOSINOPHIL NFR BLD AUTO: 6.7 % (ref 0.3–6.2)
ERYTHROCYTE [DISTWIDTH] IN BLOOD BY AUTOMATED COUNT: 15.9 % (ref 12.3–15.4)
GLOBULIN SER CALC-MCNC: 2.1 GM/DL
GLUCOSE SERPL-MCNC: 92 MG/DL (ref 65–99)
HCT VFR BLD AUTO: 38.3 % (ref 34–46.6)
HDLC SERPL-MCNC: 72 MG/DL (ref 40–60)
HGB BLD-MCNC: 12.3 G/DL (ref 12–15.9)
IMM GRANULOCYTES # BLD AUTO: 0.01 10*3/MM3 (ref 0–0.05)
IMM GRANULOCYTES NFR BLD AUTO: 0.2 % (ref 0–0.5)
LDLC SERPL CALC-MCNC: 145 MG/DL (ref 0–100)
LYMPHOCYTES # BLD AUTO: 1.34 10*3/MM3 (ref 0.7–3.1)
LYMPHOCYTES NFR BLD AUTO: 22.4 % (ref 19.6–45.3)
MCH RBC QN AUTO: 29 PG (ref 26.6–33)
MCHC RBC AUTO-ENTMCNC: 32.1 G/DL (ref 31.5–35.7)
MCV RBC AUTO: 90.3 FL (ref 79–97)
MONOCYTES # BLD AUTO: 0.67 10*3/MM3 (ref 0.1–0.9)
MONOCYTES NFR BLD AUTO: 11.2 % (ref 5–12)
NEUTROPHILS # BLD AUTO: 3.52 10*3/MM3 (ref 1.7–7)
NEUTROPHILS NFR BLD AUTO: 58.7 % (ref 42.7–76)
NRBC BLD AUTO-RTO: 0 /100 WBC (ref 0–0.2)
PLATELET # BLD AUTO: 313 10*3/MM3 (ref 140–450)
POTASSIUM SERPL-SCNC: 3.9 MMOL/L (ref 3.5–5.2)
PROT SERPL-MCNC: 6.7 G/DL (ref 6–8.5)
RBC # BLD AUTO: 4.24 10*6/MM3 (ref 3.77–5.28)
SODIUM SERPL-SCNC: 137 MMOL/L (ref 136–145)
T4 FREE SERPL-MCNC: 1.54 NG/DL (ref 0.93–1.7)
TRIGL SERPL-MCNC: 74 MG/DL (ref 0–150)
TSH SERPL DL<=0.005 MIU/L-ACNC: 1.66 UIU/ML (ref 0.27–4.2)
VIT B12 SERPL-MCNC: 674 PG/ML (ref 211–946)
VLDLC SERPL CALC-MCNC: 14.8 MG/DL
WBC # BLD AUTO: 5.99 10*3/MM3 (ref 3.4–10.8)

## 2020-08-25 ENCOUNTER — OFFICE VISIT (OUTPATIENT)
Dept: INTERNAL MEDICINE | Facility: CLINIC | Age: 45
End: 2020-08-25

## 2020-08-25 VITALS
HEART RATE: 74 BPM | HEIGHT: 60 IN | WEIGHT: 159 LBS | SYSTOLIC BLOOD PRESSURE: 137 MMHG | BODY MASS INDEX: 31.22 KG/M2 | TEMPERATURE: 99.6 F | DIASTOLIC BLOOD PRESSURE: 85 MMHG | OXYGEN SATURATION: 100 %

## 2020-08-25 DIAGNOSIS — E03.9 ACQUIRED HYPOTHYROIDISM: ICD-10-CM

## 2020-08-25 DIAGNOSIS — J45.30 MILD PERSISTENT REACTIVE AIRWAY DISEASE WITHOUT COMPLICATION: ICD-10-CM

## 2020-08-25 DIAGNOSIS — F41.9 ANXIETY: ICD-10-CM

## 2020-08-25 DIAGNOSIS — R53.83 FATIGUE, UNSPECIFIED TYPE: Primary | ICD-10-CM

## 2020-08-25 PROCEDURE — 99213 OFFICE O/P EST LOW 20 MIN: CPT | Performed by: INTERNAL MEDICINE

## 2020-08-25 RX ORDER — ALBUTEROL SULFATE 90 UG/1
2 AEROSOL, METERED RESPIRATORY (INHALATION) EVERY 4 HOURS PRN
Qty: 18 G | Refills: 1 | Status: SHIPPED | OUTPATIENT
Start: 2020-08-25

## 2020-08-25 RX ORDER — ESCITALOPRAM OXALATE 5 MG/1
5 TABLET ORAL DAILY
Qty: 90 TABLET | Refills: 3 | Status: SHIPPED | OUTPATIENT
Start: 2020-08-25 | End: 2021-04-22

## 2020-08-25 NOTE — PROGRESS NOTES
Subjective     Patient ID: Sunitha Gregory is a 45 y.o. female. Patient is here for management of multiple medical problems.     Chief Complaint   Patient presents with   • Follow-up     on thyroid pt states her hair has been falling out more     History of Present Illness       Hair is falling out more.      Wt loss going ok then platue.  Last fill on thyroid med last friday and pt has  Been feeling bad x 2 months.  Still exercising. Wt going up and no decrease in     The following portions of the patient's history were reviewed and updated as appropriate: allergies, current medications, past family history, past medical history, past social history, past surgical history and problem list.    Review of Systems   Constitutional: Positive for fatigue.   HENT: Negative for congestion, sneezing and sore throat.    Gastrointestinal: Negative for blood in stool, constipation and diarrhea.   Musculoskeletal: Negative for gait problem, joint swelling and myalgias.   Psychiatric/Behavioral: Negative for self-injury and sleep disturbance. The patient is not nervous/anxious.    All other systems reviewed and are negative.      Current Outpatient Medications:   •  albuterol sulfate  (90 Base) MCG/ACT inhaler, Inhale 2 puffs Every 4 (Four) Hours As Needed for Wheezing or Shortness of Air., Disp: 18 g, Rfl: 1  •  BIOTIN PO, Take  by mouth Daily., Disp: , Rfl:   •  buPROPion SR (WELLBUTRIN SR) 150 MG 12 hr tablet, Take 1 tablet by mouth 2 (Two) Times a Day., Disp: 60 tablet, Rfl: 11  •  cetirizine (zyrTEC) 10 MG tablet, Take  by mouth Daily., Disp: , Rfl:   •  fluticasone (FLONASE) 50 MCG/ACT nasal spray, into each nostril Daily., Disp: , Rfl:   •  hydroCHLOROthiazide (HYDRODIURIL) 12.5 MG tablet, TAKE ONE TABLET BY MOUTH DAILY, Disp: 30 tablet, Rfl: 10  •  levothyroxine (SYNTHROID) 50 MCG tablet, Take 1 tablet by mouth Daily., Disp: 30 tablet, Rfl: 11  •  Multiple Vitamin (MULTI-VITAMIN DAILY) tablet, Take  by mouth  "Daily., Disp: , Rfl:   •  escitalopram (LEXAPRO) 5 MG tablet, Take 1 tablet by mouth Daily., Disp: 90 tablet, Rfl: 3    Objective      Blood pressure 137/85, pulse 74, temperature 99.6 °F (37.6 °C), temperature source Temporal, height 152.4 cm (60\"), weight 72.1 kg (159 lb), SpO2 100 %.    Physical Exam     General Appearance:    Alert, cooperative, no distress, appears stated age   Head:    Normocephalic, without obvious abnormality, atraumatic   Eyes:    PERRL, conjunctiva/corneas clear, EOM's intact   Ears:    Normal TM's and external ear canals, both ears   Nose:   Nares normal, septum midline, mucosa normal, no drainage   or sinus tenderness   Throat:   Lips, mucosa, and tongue normal; teeth and gums normal   Neck:   Supple, symmetrical, trachea midline, no adenopathy;        thyroid:  No enlargement/tenderness/nodules; no carotid    bruit or JVD   Back:     Symmetric, no curvature, ROM normal, no CVA tenderness   Lungs:     Clear to auscultation bilaterally, respirations unlabored   Chest wall:    No tenderness or deformity   Heart:    Regular rate and rhythm, S1 and S2 normal, no murmur,        rub or gallop   Abdomen:     Soft, non-tender, bowel sounds active all four quadrants,     no masses, no organomegaly   Extremities:   Extremities normal, atraumatic, no cyanosis or edema   Pulses:   2+ and symmetric all extremities   Skin:   Skin color, texture, turgor normal, no rashes or lesions   Lymph nodes:   Cervical, supraclavicular, and axillary nodes normal   Neurologic:   CNII-XII intact. Normal strength, sensation and reflexes       throughout      Results for orders placed or performed in visit on 07/13/20   TSH   Result Value Ref Range    TSH 1.660 0.270 - 4.200 uIU/mL   T4, Free   Result Value Ref Range    Free T4 1.54 0.93 - 1.70 ng/dL   Comprehensive Metabolic Panel   Result Value Ref Range    Glucose 92 65 - 99 mg/dL    BUN 15 6 - 20 mg/dL    Creatinine 0.82 0.57 - 1.00 mg/dL    eGFR Non African Am 75 " >60 mL/min/1.73    eGFR African Am 91 >60 mL/min/1.73    BUN/Creatinine Ratio 18.3 7.0 - 25.0    Sodium 137 136 - 145 mmol/L    Potassium 3.9 3.5 - 5.2 mmol/L    Chloride 102 98 - 107 mmol/L    Total CO2 25.9 22.0 - 29.0 mmol/L    Calcium 9.0 8.6 - 10.5 mg/dL    Total Protein 6.7 6.0 - 8.5 g/dL    Albumin 4.60 3.50 - 5.20 g/dL    Globulin 2.1 gm/dL    A/G Ratio 2.2 g/dL    Total Bilirubin 0.7 0.0 - 1.2 mg/dL    Alkaline Phosphatase 59 39 - 117 U/L    AST (SGOT) 18 1 - 32 U/L    ALT (SGPT) 19 1 - 33 U/L   Vitamin B12   Result Value Ref Range    Vitamin B-12 674 211 - 946 pg/mL   Lipid Panel   Result Value Ref Range    Total Cholesterol 232 (H) 0 - 200 mg/dL    Triglycerides 74 0 - 150 mg/dL    HDL Cholesterol 72 (H) 40 - 60 mg/dL    VLDL Cholesterol 14.8 mg/dL    LDL Cholesterol  145 (H) 0 - 100 mg/dL   CBC & Differential   Result Value Ref Range    WBC 5.99 3.40 - 10.80 10*3/mm3    RBC 4.24 3.77 - 5.28 10*6/mm3    Hemoglobin 12.3 12.0 - 15.9 g/dL    Hematocrit 38.3 34.0 - 46.6 %    MCV 90.3 79.0 - 97.0 fL    MCH 29.0 26.6 - 33.0 pg    MCHC 32.1 31.5 - 35.7 g/dL    RDW 15.9 (H) 12.3 - 15.4 %    Platelets 313 140 - 450 10*3/mm3    Neutrophil Rel % 58.7 42.7 - 76.0 %    Lymphocyte Rel % 22.4 19.6 - 45.3 %    Monocyte Rel % 11.2 5.0 - 12.0 %    Eosinophil Rel % 6.7 (H) 0.3 - 6.2 %    Basophil Rel % 0.8 0.0 - 1.5 %    Neutrophils Absolute 3.52 1.70 - 7.00 10*3/mm3    Lymphocytes Absolute 1.34 0.70 - 3.10 10*3/mm3    Monocytes Absolute 0.67 0.10 - 0.90 10*3/mm3    Eosinophils Absolute 0.40 0.00 - 0.40 10*3/mm3    Basophils Absolute 0.05 0.00 - 0.20 10*3/mm3    Immature Granulocyte Rel % 0.2 0.0 - 0.5 %    Immature Grans Absolute 0.01 0.00 - 0.05 10*3/mm3    nRBC 0.0 0.0 - 0.2 /100 WBC         Assessment/Plan   Pt with increasing panic attracts. Had to shut the new and Francis off. Now some better.        Sunitha was seen today for follow-up.    Diagnoses and all orders for this visit:    Fatigue, unspecified type    Mild  persistent reactive airway disease without complication  -     albuterol sulfate  (90 Base) MCG/ACT inhaler; Inhale 2 puffs Every 4 (Four) Hours As Needed for Wheezing or Shortness of Air.    Acquired hypothyroidism    Anxiety  -     escitalopram (LEXAPRO) 5 MG tablet; Take 1 tablet by mouth Daily.      Return in about 6 weeks (around 10/6/2020).          There are no Patient Instructions on file for this visit.     Quinten Pal MD    Assessment/Plan

## 2020-09-09 PROBLEM — Z98.891 HISTORY OF CESAREAN DELIVERY: Status: ACTIVE | Noted: 2020-09-09

## 2020-09-09 PROBLEM — Z98.51 HISTORY OF BILATERAL TUBAL LIGATION: Status: ACTIVE | Noted: 2020-09-09

## 2020-09-09 PROBLEM — E66.3 OVERWEIGHT: Status: ACTIVE | Noted: 2020-09-09

## 2020-09-10 ENCOUNTER — OFFICE VISIT (OUTPATIENT)
Dept: OBSTETRICS AND GYNECOLOGY | Facility: CLINIC | Age: 45
End: 2020-09-10

## 2020-09-10 VITALS
DIASTOLIC BLOOD PRESSURE: 82 MMHG | BODY MASS INDEX: 30.63 KG/M2 | SYSTOLIC BLOOD PRESSURE: 118 MMHG | HEIGHT: 60 IN | WEIGHT: 156 LBS

## 2020-09-10 DIAGNOSIS — E66.09 CLASS 1 OBESITY DUE TO EXCESS CALORIES WITHOUT SERIOUS COMORBIDITY WITH BODY MASS INDEX (BMI) OF 30.0 TO 30.9 IN ADULT: ICD-10-CM

## 2020-09-10 DIAGNOSIS — Z12.31 BREAST CANCER SCREENING BY MAMMOGRAM: ICD-10-CM

## 2020-09-10 DIAGNOSIS — Z01.419 WOMEN'S ANNUAL ROUTINE GYNECOLOGICAL EXAMINATION: Primary | ICD-10-CM

## 2020-09-10 DIAGNOSIS — Z98.51 HISTORY OF BILATERAL TUBAL LIGATION: ICD-10-CM

## 2020-09-10 DIAGNOSIS — Z98.891 HISTORY OF CESAREAN DELIVERY: ICD-10-CM

## 2020-09-10 PROBLEM — E66.9 CLASS 1 OBESITY IN ADULT: Status: ACTIVE | Noted: 2020-09-09

## 2020-09-10 PROBLEM — E66.811 CLASS 1 OBESITY IN ADULT: Status: ACTIVE | Noted: 2020-09-09

## 2020-09-10 PROCEDURE — 99396 PREV VISIT EST AGE 40-64: CPT | Performed by: OBSTETRICS & GYNECOLOGY

## 2020-09-10 NOTE — PROGRESS NOTES
GYN Annual Exam     CC - Here for annual exam.     Subjective   HPI  Sunitha Gregory is a 45 y.o. female, , who presents for annual well woman exam. Patient's last menstrual period was 2020 (exact date)..  Periods are regular every 25-35 days, lasting 4 days.  Dysmenorrhea:moderate, occurring every other month.  Patient reports problems with: none.  Partner Status: Marital Status: .  New Partners since last visit: no.  Desires STD Screening: no.    Additional OB/GYN History   Current contraception: contraceptive methods: Tubal ligation  Desires to: do not start contraception  Last Pap :   Last Completed Pap Smear       Status Date      PAP SMEAR Done 2019 Dr Haile        History of abnormal Pap smear: no  Family history of uterine, colon, breast, or ovarian cancer: no  Performs monthly Self-Breast Exam: yes  Last mammogram:   Last Completed Mammogram       Status Date      MAMMOGRAM Done 2019 MAMMO SCREENING DIGITAL TOMOSYNTHESIS BILATERAL W CAD     Patient has more history with this topic...         Exercises Regularly: no  Feelings of Anxiety or Depression: yes - managed with lexapro and wellbutrin  Tobacco Usage?: No   OB History        3    Para   3    Term   3            AB        Living           SAB        TAB        Ectopic        Molar        Multiple        Live Births                    Health Maintenance   Topic Date Due   • Annual Gynecologic Pelvic and Breast Exam  1974   • HEPATITIS C SCREENING  2016   • COLONOSCOPY  2016   • ANNUAL PHYSICAL  2020   • INFLUENZA VACCINE  2020   • MAMMOGRAM  2020   • LIPID PANEL  2021   • PAP SMEAR  2022   • TDAP/TD VACCINES (2 - Td) 2029       The additional following portions of the patient's history were reviewed and updated as appropriate: allergies, current medications, past family history, past medical history, past social history, past surgical history and problem  "list.    Review of Systems   Constitutional: Negative.    HENT: Negative.    Eyes: Negative.    Respiratory: Negative.    Cardiovascular: Negative.    Gastrointestinal: Negative.    Endocrine: Negative.    Genitourinary: Negative.    Musculoskeletal: Negative.    Skin: Negative.    Allergic/Immunologic: Negative.    Neurological: Negative.    Hematological: Negative.    Psychiatric/Behavioral:        Anxiety/depression       I have reviewed and agree with the HPI, ROS, and historical information as entered above. Lissette Cole MD    Objective   /82   Ht 152.4 cm (60\")   Wt 70.8 kg (156 lb)   LMP 08/28/2020 (Exact Date)   Breastfeeding No   BMI 30.47 kg/m²     Physical Exam   Constitutional: She is oriented to person, place, and time. She appears well-developed and well-nourished.   HENT:   Head: Normocephalic and atraumatic.   Neck: Normal range of motion. No muscular tenderness present. No thyroid mass and no thyromegaly present.   Cardiovascular: Normal rate and regular rhythm.   No murmur heard.  Pulmonary/Chest: Effort normal and breath sounds normal. She has no wheezes. She has no rhonchi. She has no rales. She exhibits no mass, no tenderness and no retraction. Right breast exhibits no mass, no nipple discharge, no skin change and no tenderness. Left breast exhibits no mass, no nipple discharge, no skin change and no tenderness.   Abdominal: Soft. Bowel sounds are normal. She exhibits no mass. There is no tenderness. There is no rigidity and no guarding. No hernia. Hernia confirmed negative in the right inguinal area and confirmed negative in the left inguinal area.   Genitourinary: Vagina normal, uterus normal and cervix normal. Rectal exam shows no external hemorrhoid. There is no rash, tenderness or lesion on the right labia. There is no rash, tenderness or lesion on the left labia. Right adnexum displays no mass and no tenderness. Left adnexum displays no mass and no tenderness. Vagina " exhibits no lesion. No vaginal discharge found.   Genitourinary Comments: Chaperone Present   Lymphadenopathy:        Right: No inguinal adenopathy present.        Left: No inguinal adenopathy present.   Neurological: She is alert and oriented to person, place, and time.   Psychiatric: She has a normal mood and affect. Her behavior is normal.   Nursing note and vitals reviewed.      Assessment/Plan     Assessment     Problem List Items Addressed This Visit        Digestive    Class 1 obesity in adult       Other    History of bilateral tubal ligation    Overview     2016         History of  delivery    Overview     , ,            Other Visit Diagnoses     Women's annual routine gynecological examination    -  Primary    Relevant Orders    Pap IG, Rfx HPV ASCU    Breast cancer screening by mammogram              1. GYN annual well woman exam.     Plan     1. Recommended use of Vitamin D replacement and getting adequate calcium in her diet. (1500mg)  2. Reviewed monthly self breast exams.  Instructed to call with lumps, pain, or breast discharge.    3. Ordered Mammogram today  4. Reviewed BMI and weight loss as preventative health measures.   5. Reviewed exercise as a preventative health measures.   6. RTC in 1 year or PRN with problems.      Lissette Cole MD  09/10/2020

## 2020-09-17 DIAGNOSIS — Z01.419 WOMEN'S ANNUAL ROUTINE GYNECOLOGICAL EXAMINATION: ICD-10-CM

## 2020-10-22 ENCOUNTER — OFFICE VISIT (OUTPATIENT)
Dept: INTERNAL MEDICINE | Facility: CLINIC | Age: 45
End: 2020-10-22

## 2020-10-22 VITALS
TEMPERATURE: 97.6 F | WEIGHT: 162.4 LBS | BODY MASS INDEX: 31.88 KG/M2 | HEART RATE: 84 BPM | SYSTOLIC BLOOD PRESSURE: 122 MMHG | HEIGHT: 60 IN | OXYGEN SATURATION: 94 % | DIASTOLIC BLOOD PRESSURE: 78 MMHG

## 2020-10-22 DIAGNOSIS — N92.6 ABNORMAL MENSES: Primary | ICD-10-CM

## 2020-10-22 DIAGNOSIS — H93.292 ABNORMAL AUDITORY PERCEPTION OF LEFT EAR: ICD-10-CM

## 2020-10-22 DIAGNOSIS — J45.20 MILD INTERMITTENT ASTHMA, UNSPECIFIED WHETHER COMPLICATED: ICD-10-CM

## 2020-10-22 DIAGNOSIS — F41.9 ANXIETY: ICD-10-CM

## 2020-10-22 PROCEDURE — 90686 IIV4 VACC NO PRSV 0.5 ML IM: CPT | Performed by: INTERNAL MEDICINE

## 2020-10-22 PROCEDURE — 90471 IMMUNIZATION ADMIN: CPT | Performed by: INTERNAL MEDICINE

## 2020-10-22 PROCEDURE — 99214 OFFICE O/P EST MOD 30 MIN: CPT | Performed by: INTERNAL MEDICINE

## 2020-10-22 RX ORDER — ESCITALOPRAM OXALATE 10 MG/1
10 TABLET ORAL DAILY
Qty: 90 TABLET | Refills: 3 | Status: SHIPPED | OUTPATIENT
Start: 2020-10-22 | End: 2021-04-22 | Stop reason: SDUPTHER

## 2020-10-22 NOTE — PROGRESS NOTES
Subjective     Patient ID: Sunitha Gregory is a 45 y.o. female. Patient is here for management of multiple medical problems.     Chief Complaint   Patient presents with   • Anxiety     States that the medication is helping some but that it's wearing off as the day goes on.     History of Present Illness     Asthma. Stable.    Rolled ankle 2.5 weeks ago.   In [facility] an missed a bar in [place].        Anxiety. Taking Wellbutrin.      The following portions of the patient's history were reviewed and updated as appropriate: allergies, current medications, past family history, past medical history, past social history, past surgical history and problem list.    Review of Systems   Constitutional: Negative for diaphoresis and fatigue.   HENT: Negative for congestion, dental problem, nosebleeds and postnasal drip.    Cardiovascular: Negative for palpitations and leg swelling.   Gastrointestinal: Negative for abdominal distention.   Musculoskeletal: Negative for back pain, gait problem and joint swelling.   Psychiatric/Behavioral: Negative for self-injury. The patient is not nervous/anxious.        Current Outpatient Medications:   •  albuterol sulfate  (90 Base) MCG/ACT inhaler, Inhale 2 puffs Every 4 (Four) Hours As Needed for Wheezing or Shortness of Air., Disp: 18 g, Rfl: 1  •  BIOTIN PO, Take  by mouth Daily., Disp: , Rfl:   •  buPROPion SR (WELLBUTRIN SR) 150 MG 12 hr tablet, Take 1 tablet by mouth 2 (Two) Times a Day., Disp: 60 tablet, Rfl: 11  •  cetirizine (zyrTEC) 10 MG tablet, Take  by mouth Daily., Disp: , Rfl:   •  escitalopram (LEXAPRO) 5 MG tablet, Take 1 tablet by mouth Daily., Disp: 90 tablet, Rfl: 3  •  fluticasone (FLONASE) 50 MCG/ACT nasal spray, into each nostril Daily., Disp: , Rfl:   •  hydroCHLOROthiazide (HYDRODIURIL) 12.5 MG tablet, TAKE ONE TABLET BY MOUTH DAILY, Disp: 30 tablet, Rfl: 10  •  levothyroxine (SYNTHROID) 50 MCG tablet, Take 1 tablet by mouth Daily., Disp: 30 tablet, Rfl: 11  •   "Multiple Vitamin (MULTI-VITAMIN DAILY) tablet, Take  by mouth Daily., Disp: , Rfl:   •  escitalopram (Lexapro) 10 MG tablet, Take 1 tablet by mouth Daily., Disp: 90 tablet, Rfl: 3    Objective      Blood pressure 122/78, pulse 84, temperature 97.6 °F (36.4 °C), height 152.4 cm (60\"), weight 73.7 kg (162 lb 6.4 oz), SpO2 94 %, not currently breastfeeding.    Physical Exam     General Appearance:    Alert, cooperative, no distress, appears stated age   Head:    Normocephalic, without obvious abnormality, atraumatic   Eyes:    PERRL, conjunctiva/corneas clear, EOM's intact   Ears:    Normal TM's and external ear canals, both ears   Nose:   Nares normal, septum midline, mucosa normal, no drainage   or sinus tenderness   Throat:   Lips, mucosa, and tongue normal; teeth and gums normal   Neck:   Supple, symmetrical, trachea midline, no adenopathy;        thyroid:  No enlargement/tenderness/nodules; no carotid    bruit or JVD   Back:     Symmetric, no curvature, ROM normal, no CVA tenderness   Lungs:     Clear to auscultation bilaterally, respirations unlabored   Chest wall:    No tenderness or deformity   Heart:    Regular rate and rhythm, S1 and S2 normal, no murmur,        rub or gallop   Abdomen:     Soft, non-tender, bowel sounds active all four quadrants,     no masses, no organomegaly   Extremities:   Extremities normal, atraumatic, no cyanosis or edema   Pulses:   2+ and symmetric all extremities   Skin:   Skin color, texture, turgor normal, no rashes or lesions   Lymph nodes:   Cervical, supraclavicular, and axillary nodes normal   Neurologic:   CNII-XII intact. Normal strength, sensation and reflexes       throughout      Results for orders placed or performed in visit on 07/13/20   TSH    Specimen: Blood   Result Value Ref Range    TSH 1.660 0.270 - 4.200 uIU/mL   T4, Free    Specimen: Blood   Result Value Ref Range    Free T4 1.54 0.93 - 1.70 ng/dL   Comprehensive Metabolic Panel    Specimen: Blood   Result " Value Ref Range    Glucose 92 65 - 99 mg/dL    BUN 15 6 - 20 mg/dL    Creatinine 0.82 0.57 - 1.00 mg/dL    eGFR Non African Am 75 >60 mL/min/1.73    eGFR African Am 91 >60 mL/min/1.73    BUN/Creatinine Ratio 18.3 7.0 - 25.0    Sodium 137 136 - 145 mmol/L    Potassium 3.9 3.5 - 5.2 mmol/L    Chloride 102 98 - 107 mmol/L    Total CO2 25.9 22.0 - 29.0 mmol/L    Calcium 9.0 8.6 - 10.5 mg/dL    Total Protein 6.7 6.0 - 8.5 g/dL    Albumin 4.60 3.50 - 5.20 g/dL    Globulin 2.1 gm/dL    A/G Ratio 2.2 g/dL    Total Bilirubin 0.7 0.0 - 1.2 mg/dL    Alkaline Phosphatase 59 39 - 117 U/L    AST (SGOT) 18 1 - 32 U/L    ALT (SGPT) 19 1 - 33 U/L   Vitamin B12    Specimen: Blood   Result Value Ref Range    Vitamin B-12 674 211 - 946 pg/mL   Lipid Panel    Specimen: Blood   Result Value Ref Range    Total Cholesterol 232 (H) 0 - 200 mg/dL    Triglycerides 74 0 - 150 mg/dL    HDL Cholesterol 72 (H) 40 - 60 mg/dL    VLDL Cholesterol 14.8 mg/dL    LDL Cholesterol  145 (H) 0 - 100 mg/dL   CBC & Differential    Specimen: Blood   Result Value Ref Range    WBC 5.99 3.40 - 10.80 10*3/mm3    RBC 4.24 3.77 - 5.28 10*6/mm3    Hemoglobin 12.3 12.0 - 15.9 g/dL    Hematocrit 38.3 34.0 - 46.6 %    MCV 90.3 79.0 - 97.0 fL    MCH 29.0 26.6 - 33.0 pg    MCHC 32.1 31.5 - 35.7 g/dL    RDW 15.9 (H) 12.3 - 15.4 %    Platelets 313 140 - 450 10*3/mm3    Neutrophil Rel % 58.7 42.7 - 76.0 %    Lymphocyte Rel % 22.4 19.6 - 45.3 %    Monocyte Rel % 11.2 5.0 - 12.0 %    Eosinophil Rel % 6.7 (H) 0.3 - 6.2 %    Basophil Rel % 0.8 0.0 - 1.5 %    Neutrophils Absolute 3.52 1.70 - 7.00 10*3/mm3    Lymphocytes Absolute 1.34 0.70 - 3.10 10*3/mm3    Monocytes Absolute 0.67 0.10 - 0.90 10*3/mm3    Eosinophils Absolute 0.40 0.00 - 0.40 10*3/mm3    Basophils Absolute 0.05 0.00 - 0.20 10*3/mm3    Immature Granulocyte Rel % 0.2 0.0 - 0.5 %    Immature Grans Absolute 0.01 0.00 - 0.05 10*3/mm3    nRBC 0.0 0.0 - 0.2 /100 WBC         Assessment/Plan   ent in past had abnormal  skin on tm. needed intervention  [id number]  Static sensation in left ear.        Diagnoses and all orders for this visit:    1. Abnormal menses (Primary)  -     CBC & Differential  -     Vitamin B12  -     TSH  -     T4, Free  -     Comprehensive Metabolic Panel  -     Iron Profile  -     Folate    2. Anxiety    3. Mild intermittent asthma, unspecified whether complicated  -     CBC & Differential  -     Vitamin B12  -     TSH  -     T4, Free  -     Comprehensive Metabolic Panel  -     Iron Profile  -     Folate    4. Abnormal auditory perception of left ear  -     Ambulatory Referral to ENT (Otolaryngology)    Other orders  -     Fluarix Quad >6 Months (3085-3174)  -     escitalopram (Lexapro) 10 MG tablet; Take 1 tablet by mouth Daily.  Dispense: 90 tablet; Refill: 3      No follow-ups on file.          There are no Patient Instructions on file for this visit.     Quinten Pal MD    Assessment/Plan

## 2020-12-22 ENCOUNTER — HOSPITAL ENCOUNTER (OUTPATIENT)
Dept: MAMMOGRAPHY | Facility: HOSPITAL | Age: 45
Discharge: HOME | End: 2020-12-22
Admitting: OBSTETRICS & GYNECOLOGY

## 2020-12-22 DIAGNOSIS — Z12.31 BREAST CANCER SCREENING BY MAMMOGRAM: ICD-10-CM

## 2020-12-22 PROCEDURE — 77063 BREAST TOMOSYNTHESIS BI: CPT | Performed by: RADIOLOGY

## 2020-12-22 PROCEDURE — 77067 SCR MAMMO BI INCL CAD: CPT | Performed by: RADIOLOGY

## 2020-12-22 PROCEDURE — 77063 BREAST TOMOSYNTHESIS BI: CPT

## 2020-12-22 PROCEDURE — 77067 SCR MAMMO BI INCL CAD: CPT

## 2021-02-17 RX ORDER — LEVOTHYROXINE SODIUM 0.05 MG/1
TABLET ORAL
Qty: 90 TABLET | Refills: 10 | Status: SHIPPED | OUTPATIENT
Start: 2021-02-17 | End: 2022-02-25

## 2021-02-17 RX ORDER — LEVOTHYROXINE SODIUM 0.05 MG/1
50 TABLET ORAL DAILY
Qty: 30 TABLET | Refills: 11 | OUTPATIENT
Start: 2021-02-17

## 2021-02-23 DIAGNOSIS — F41.9 ANXIETY: ICD-10-CM

## 2021-02-23 RX ORDER — BUPROPION HYDROCHLORIDE 150 MG/1
TABLET, EXTENDED RELEASE ORAL
Qty: 180 TABLET | Refills: 3 | Status: SHIPPED | OUTPATIENT
Start: 2021-02-23 | End: 2022-02-25 | Stop reason: SDUPTHER

## 2021-02-23 RX ORDER — BUPROPION HYDROCHLORIDE 150 MG/1
150 TABLET, EXTENDED RELEASE ORAL 2 TIMES DAILY
Qty: 60 TABLET | Refills: 11 | OUTPATIENT
Start: 2021-02-23

## 2021-03-05 ENCOUNTER — TELEPHONE (OUTPATIENT)
Dept: INTERNAL MEDICINE | Facility: CLINIC | Age: 46
End: 2021-03-05

## 2021-03-05 NOTE — TELEPHONE ENCOUNTER
Caller: DEMAR MEJIA @ [employer] [facility]    Relationship to patient: Other    Best call back number: [phone]    Patient is needing: DEMAR REQUESTED A STATEMENT FOR PATIENT REGARDING THE [employer] HER AND  APPLIED FOR    DEMAR STATED THAT SHE NEEDED A STATEMENT FROM PROVIDER STATING THAT THE MEDICATION PATIENT IS TAKING WOULD NOT BE IN THE WAY OF TAKING CARE OF ANY CHILDREN THAT WOULD BE UNDER SUPERVISION OF PATIENT IN THEIR HOME    DEMAR STATED THAT OFFICE COULD EMAIL TO HER IF NOT PLEASE ADVISE    EMAIL : [email or web address]

## 2021-03-08 ENCOUNTER — OFFICE VISIT (OUTPATIENT)
Dept: OBSTETRICS AND GYNECOLOGY | Facility: CLINIC | Age: 46
End: 2021-03-08

## 2021-03-08 VITALS
WEIGHT: 168 LBS | HEIGHT: 60 IN | BODY MASS INDEX: 32.98 KG/M2 | SYSTOLIC BLOOD PRESSURE: 148 MMHG | DIASTOLIC BLOOD PRESSURE: 100 MMHG

## 2021-03-08 DIAGNOSIS — R53.83 FATIGUE, UNSPECIFIED TYPE: ICD-10-CM

## 2021-03-08 DIAGNOSIS — Z12.31 BREAST CANCER SCREENING BY MAMMOGRAM: Primary | ICD-10-CM

## 2021-03-08 DIAGNOSIS — N93.9 ABNORMAL UTERINE BLEEDING (AUB): ICD-10-CM

## 2021-03-08 DIAGNOSIS — L65.9 HAIR LOSS: ICD-10-CM

## 2021-03-08 PROCEDURE — 58100 BIOPSY OF UTERUS LINING: CPT | Performed by: OBSTETRICS & GYNECOLOGY

## 2021-03-08 PROCEDURE — 99214 OFFICE O/P EST MOD 30 MIN: CPT | Performed by: OBSTETRICS & GYNECOLOGY

## 2021-03-08 RX ORDER — CHLORAL HYDRATE 500 MG
CAPSULE ORAL
COMMUNITY
End: 2022-07-26

## 2021-03-08 RX ORDER — IPRATROPIUM BROMIDE 21 UG/1
SPRAY, METERED NASAL
COMMUNITY
End: 2022-07-26

## 2021-03-08 NOTE — PROGRESS NOTES
Chief Complaint   Patient presents with   • AUB            HPI  Sunitha Gregory is a 45 y.o. female, , who presents with recurrent evaluation of Abnormal Uterine Bleeding      Her last LMP was Patient's last menstrual period was 2021..  Periods are irregular since 10/2020.    She states she has experienced this problem for 5 months.  She describes the severity as intermittent.  She states that the problem is intermittent.  The patient reports additional symptoms as hairloss, anxiety.  The patient has been evaluated: No.  In the past the patient has tried none.    Did the patient have u/s today? No.    Patient c/o AUB since last . She had a normal period the last week of Sept/first week of Oct. She stopped bleeding on a Saturday and the following Monday (1 week later) she started again and bled like a normal period. She then had a period 28 days later in November that lasted for 11 days. Patient reports a normal period in Dec and . February, she had a normal period but again started bleeding about 1 week after she stopped, this time is was only spotting for a few days.     She also states she started lexapro per pcp 10/2020 and low dose levothryoxine-had labs and c/o hairloss and anxiety.     Additional OB/GYN History   Last Pap :   Last Completed Pap Smear       Status Date      PAP SMEAR Done 9/10/2020 PAP IG, RFX HPV ASCU     Patient has more history with this topic...        History of abnormal Pap smear: no  Tobacco Usage?: No     The additional following portions of the patient's history were reviewed and updated as appropriate: allergies, current medications and past medical history.    Review of Systems   Constitutional: Negative.    HENT: Negative.    Respiratory: Negative.    Cardiovascular: Negative.    Gastrointestinal: Negative.    Genitourinary: Positive for menstrual problem.   Musculoskeletal: Negative.    Skin: Negative.    Allergic/Immunologic: Negative.    Neurological: Negative.  "   Hematological: Negative.    Psychiatric/Behavioral: Negative.      All other systems reviewed and are negative.     I have reviewed and agree with the HPI, ROS, and historical information as entered above. Lissette Cole MD    Objective   /100   Ht 152.4 cm (60\")   Wt 76.2 kg (168 lb)   LMP 2021   BMI 32.81 kg/m²     Physical Exam  Vitals and nursing note reviewed. Exam conducted with a chaperone present.   Constitutional:       Appearance: Normal appearance. She is well-developed.   HENT:      Head: Normocephalic and atraumatic.   Pulmonary:      Effort: Pulmonary effort is normal.      Breath sounds: Normal breath sounds.   Abdominal:      General: There is no distension.      Palpations: Abdomen is soft. Abdomen is not rigid. There is no mass.      Tenderness: There is no abdominal tenderness. There is no guarding or rebound.   Genitourinary:     General: Normal vulva.      Exam position: Lithotomy position.      Labia:         Right: No rash, tenderness or lesion.         Left: No rash, tenderness or lesion.       Urethra: No urethral pain, urethral swelling or urethral lesion.      Vagina: Normal. No tenderness or lesions.      Cervix: No cervical motion tenderness, discharge, lesion or cervical bleeding.      Uterus: Normal. Not enlarged, not fixed and not tender.       Adnexa:         Right: No mass, tenderness or fullness.          Left: No mass, tenderness or fullness.        Rectum: No external hemorrhoid.      Comments: Chaperone Present.  Small amount of bleeding noted today.  Musculoskeletal:      Cervical back: Normal range of motion.   Skin:     General: Skin is warm and dry.   Neurological:      Mental Status: She is alert and oriented to person, place, and time.   Psychiatric:         Mood and Affect: Mood normal.         Behavior: Behavior normal.            Assessment and Plan    Endometrial Biopsy    Sunitha Gregory is a 45 y.o. female,   , whose last menstrual " period was Patient's last menstrual period was 02/01/2021. She is s/p BTL.  The patient has a history of dysfunctional uterine bleeding and presents for an endometrial biopsy. After the indications, risks, benefits, and alternatives to performing and endometrial biopsy were explained to the patient, opted to proceed with the endometrial biopsy.  A urine pregnancy test was negative.     PROCEDURE:  The patient was placed on the table in the supine lithotomy position.  She was draped in the appropriate manner.  A speculum was placed in the vagina.  The cervix was visualized and prepped with Betadine. A tenaculum was placed. A small plastic 5 mm Pipelle syringe curette was inserted into the cervical canal.  The uterus was sounded to 7 cms.  A vigorous four quadrant biopsy was performed, removing a moderate amount of tissue.  This tissue was placed in Formalin and sent to pathology.  The patient tolerated the procedure well and reported Mild cramping.  She had No cramping at the time of discharge.      Endometrial Biopsy    Date/Time: 3/8/2021 12:27 PM  Performed by: Lissette Cole MD  Authorized by: Lissette Cole MD   Preparation: Patient was prepped and draped in the usual sterile fashion.  Local anesthesia used: no    Anesthesia:  Local anesthesia used: no    Sedation:  Patient sedated: no    Patient tolerance: patient tolerated the procedure well with no immediate complications            Instructions  1. Call the office in 5 business days for biopsy results.  2. Patient instructed to call the office if develops a fever of 100.4 or greater, vaginal bleeding heavier than a period, foul vaginal discharge or pain.    Problem List Items Addressed This Visit        Genitourinary and Reproductive     Abnormal uterine bleeding (AUB)    Overview     Patient started thyroid medicine in spring 2020.  In the fall, she started to have irregular bleeding where she would have a regular period once a month,  however 3 time she developed bleeding mid month as well.  We will get labs today, and an endometrial biopsy.  We discussed that thyroid dysfunction can play a part in menstrual cycling.  We also discussed menopausal transition although we discussed that this is typically spacing out as opposed to getting closer together.  We discussed considering hormonal treatment of her cycles.  She has elevated blood pressure today secondary to anxiety.  She is to watch her blood pressure and return to clinic in 2 weeks for an ultrasound.  We will review all information at that time to decide best course of action.         Relevant Orders    Tissue Pathology Exam    US Non-ob Transvaginal    TSH    Testosterone    CBC & Differential    Vitamin B12    Folate    Vitamin D 25 Hydroxy    Follicle Stimulating Hormone    Estradiol      Other Visit Diagnoses     Breast cancer screening by mammogram    -  Primary    Relevant Orders    Mammo Screening Digital Tomosynthesis Bilateral With CAD    Fatigue, unspecified type        Relevant Orders    CBC & Differential    Vitamin B12    Folate    Hair loss        Relevant Orders    TSH    Testosterone          3. Lab(s) Ordered  4. Schedule U/S for Eval  5. Call for heavy bleeding  6. Return PRN  7. Endometrial biopsy today   8. Return in about 2 weeks (around 3/22/2021) for ultrasound.    Lissette Cole MD  03/08/2021

## 2021-03-09 LAB
25(OH)D3+25(OH)D2 SERPL-MCNC: 39.4 NG/ML (ref 30–100)
BASOPHILS # BLD AUTO: 0.06 10*3/MM3 (ref 0–0.2)
BASOPHILS NFR BLD AUTO: 0.9 % (ref 0–1.5)
EOSINOPHIL # BLD AUTO: 0.4 10*3/MM3 (ref 0–0.4)
EOSINOPHIL NFR BLD AUTO: 5.9 % (ref 0.3–6.2)
ERYTHROCYTE [DISTWIDTH] IN BLOOD BY AUTOMATED COUNT: 14.3 % (ref 12.3–15.4)
ESTRADIOL SERPL-MCNC: 165 PG/ML
FOLATE SERPL-MCNC: >20 NG/ML (ref 4.78–24.2)
FSH SERPL-ACNC: 2.8 MIU/ML
HCT VFR BLD AUTO: 35.3 % (ref 34–46.6)
HGB BLD-MCNC: 11.1 G/DL (ref 12–15.9)
IMM GRANULOCYTES # BLD AUTO: 0.02 10*3/MM3 (ref 0–0.05)
IMM GRANULOCYTES NFR BLD AUTO: 0.3 % (ref 0–0.5)
LYMPHOCYTES # BLD AUTO: 1.38 10*3/MM3 (ref 0.7–3.1)
LYMPHOCYTES NFR BLD AUTO: 20.4 % (ref 19.6–45.3)
MCH RBC QN AUTO: 27.4 PG (ref 26.6–33)
MCHC RBC AUTO-ENTMCNC: 31.4 G/DL (ref 31.5–35.7)
MCV RBC AUTO: 87.2 FL (ref 79–97)
MONOCYTES # BLD AUTO: 0.68 10*3/MM3 (ref 0.1–0.9)
MONOCYTES NFR BLD AUTO: 10.1 % (ref 5–12)
NEUTROPHILS # BLD AUTO: 4.21 10*3/MM3 (ref 1.7–7)
NEUTROPHILS NFR BLD AUTO: 62.4 % (ref 42.7–76)
NRBC BLD AUTO-RTO: 0 /100 WBC (ref 0–0.2)
PLATELET # BLD AUTO: 365 10*3/MM3 (ref 140–450)
RBC # BLD AUTO: 4.05 10*6/MM3 (ref 3.77–5.28)
TESTOST SERPL-MCNC: 12 NG/DL (ref 8–48)
TSH SERPL DL<=0.005 MIU/L-ACNC: 2.63 UIU/ML (ref 0.27–4.2)
VIT B12 SERPL-MCNC: 684 PG/ML (ref 211–946)
WBC # BLD AUTO: 6.75 10*3/MM3 (ref 3.4–10.8)

## 2021-03-11 DIAGNOSIS — N93.9 ABNORMAL UTERINE BLEEDING (AUB): ICD-10-CM

## 2021-03-19 PROBLEM — Z12.31 BREAST CANCER SCREENING BY MAMMOGRAM: Status: ACTIVE | Noted: 2021-03-19

## 2021-03-22 ENCOUNTER — OFFICE VISIT (OUTPATIENT)
Dept: OBSTETRICS AND GYNECOLOGY | Facility: CLINIC | Age: 46
End: 2021-03-22

## 2021-03-22 VITALS
BODY MASS INDEX: 32.79 KG/M2 | DIASTOLIC BLOOD PRESSURE: 78 MMHG | WEIGHT: 167 LBS | SYSTOLIC BLOOD PRESSURE: 120 MMHG | HEIGHT: 60 IN

## 2021-03-22 DIAGNOSIS — N93.9 ABNORMAL UTERINE BLEEDING (AUB): Primary | ICD-10-CM

## 2021-03-22 DIAGNOSIS — Z12.31 BREAST CANCER SCREENING BY MAMMOGRAM: ICD-10-CM

## 2021-03-22 PROCEDURE — 76830 TRANSVAGINAL US NON-OB: CPT | Performed by: OBSTETRICS & GYNECOLOGY

## 2021-03-22 PROCEDURE — 99213 OFFICE O/P EST LOW 20 MIN: CPT | Performed by: OBSTETRICS & GYNECOLOGY

## 2021-03-22 NOTE — PROGRESS NOTES
Chief Complaint   Patient presents with   • Follow-up     AUB       Subjective   HPI  Sunitha Gregory is a 45 y.o. female, , who presents for follow up u/s for AUB. Patient had endometrial bx in office 3/8/2021-see results.      She states she has experienced this problem for unknown months.  She describes the severity as intermittent.  She states that the problem is intermittent.  The patient reports additional symptoms as none.      Her last LMP was Patient's last menstrual period was 03/15/2021..  Patient is c/o mid cycle bleeding-history of thyroid disease-labs at last OV.   Additional OB/GYN History   Current contraception: contraceptive methods: Tubal ligation  Desires to: do not start contraception  Last Pap :   Last Completed Pap Smear       Status Date      PAP SMEAR Done 9/10/2020 PAP IG, RFX HPV ASCU     Patient has more history with this topic...        History of abnormal Pap smear: no  Last mammogram:   Last Completed Mammogram       Status Date      MAMMOGRAM Done 2020 MAMMO SCREENING DIGITAL TOMOSYNTHESIS BILATERAL W CAD     Patient has more history with this topic...        Tobacco Usage?: No   OB History        3    Para   3    Term   3            AB        Living           SAB        TAB        Ectopic        Molar        Multiple        Live Births                    Health Maintenance   Topic Date Due   • COLONOSCOPY  Never done   • Pneumococcal Vaccine 0-64 (1 of 1 - PPSV23) Never done   • HEPATITIS C SCREENING  Never done   • ANNUAL PHYSICAL  2020   • LIPID PANEL  2021   • Annual Gynecologic Pelvic and Breast Exam  2021   • MAMMOGRAM  2021   • PAP SMEAR  09/10/2023   • TDAP/TD VACCINES (2 - Td) 2029   • INFLUENZA VACCINE  Completed   • MENINGOCOCCAL VACCINE  Aged Out       The additional following portions of the patient's history were reviewed and updated as appropriate: allergies.    Review of Systems   Constitutional: Negative.   "  HENT: Negative.    Respiratory: Negative.    Cardiovascular: Negative.    Gastrointestinal: Negative.    Genitourinary: Positive for menstrual problem.   Musculoskeletal: Negative.    Skin: Negative.    Allergic/Immunologic: Negative.    Neurological: Negative.    Hematological: Negative.    Psychiatric/Behavioral: Negative.        I have reviewed and agree with the HPI, ROS, and historical information as entered above. Lissette Cole MD    Objective   /78   Ht 152.4 cm (60\")   Wt 75.8 kg (167 lb)   LMP 03/15/2021   BMI 32.61 kg/m²     Physical Exam    Assessment/Plan     Assessment     Problem List Items Addressed This Visit        Genitourinary and Reproductive     Abnormal uterine bleeding (AUB) - Primary    Overview     Patient started thyroid medicine in spring 2020.  In the fall, she started to have irregular bleeding where she would have a regular period once a month, however 3 time she developed bleeding mid month as well.  Labs on 3/8/2021 are significant only for a hemoglobin of 11.1.  All thyroid, testosterone, FSH and estradiol are all within normal limits.Endometrial biopsy was performed and also normal.  We discussed that thyroid dysfunction can play a part in menstrual cycling.  Ultrasound on 3/22/2021 was within normal limits as well.  Discussed with patient and her  on 3/20/2021.  They desire to just watch for now.  If this continues for 6 months, they may call for reevaluation.  If they desire to try hormonal regulation with OCPs, they will call as well.  Discussed the risk of clot to legs, let lungs, and stroke with OCPs.  Patient and  verbalized understanding.         Breast cancer screening by mammogram    Overview     Last was 9/17/2019 at [facility] within normal limits             15-minute face-to-face time with patient discussing her labs, her ultrasound, and plan of care.      Return for Annual physical.  1. Return to clinic as needed.      Lissette Covington " MD [place]  03/22/2021

## 2021-04-22 ENCOUNTER — OFFICE VISIT (OUTPATIENT)
Dept: INTERNAL MEDICINE | Facility: CLINIC | Age: 46
End: 2021-04-22

## 2021-04-22 VITALS
WEIGHT: 168 LBS | DIASTOLIC BLOOD PRESSURE: 80 MMHG | SYSTOLIC BLOOD PRESSURE: 120 MMHG | HEART RATE: 84 BPM | BODY MASS INDEX: 32.98 KG/M2 | TEMPERATURE: 98.2 F | OXYGEN SATURATION: 98 % | RESPIRATION RATE: 16 BRPM | HEIGHT: 60 IN

## 2021-04-22 DIAGNOSIS — F41.9 ANXIETY: Primary | ICD-10-CM

## 2021-04-22 DIAGNOSIS — E03.9 ACQUIRED HYPOTHYROIDISM: ICD-10-CM

## 2021-04-22 DIAGNOSIS — D50.0 IRON DEFICIENCY ANEMIA DUE TO CHRONIC BLOOD LOSS: ICD-10-CM

## 2021-04-22 PROCEDURE — 99214 OFFICE O/P EST MOD 30 MIN: CPT | Performed by: INTERNAL MEDICINE

## 2021-04-22 RX ORDER — ESCITALOPRAM OXALATE 20 MG/1
20 TABLET ORAL DAILY
Qty: 90 TABLET | Refills: 3 | Status: SHIPPED | OUTPATIENT
Start: 2021-04-22 | End: 2022-02-25 | Stop reason: SDUPTHER

## 2021-04-22 RX ORDER — LEVOTHYROXINE SODIUM 0.07 MG/1
75 TABLET ORAL DAILY
Qty: 90 TABLET | Refills: 3 | Status: SHIPPED | OUTPATIENT
Start: 2021-04-22 | End: 2022-02-25

## 2021-04-22 RX ORDER — MULTIVITAMIN WITH IRON
1 TABLET ORAL DAILY
Qty: 90 TABLET | Refills: 3 | Status: SHIPPED | OUTPATIENT
Start: 2021-04-22 | End: 2022-04-22

## 2021-04-22 NOTE — PROGRESS NOTES
Subjective     Patient ID: Sunitha Gregory is a 45 y.o. female. Patient is here for management of multiple medical problems.     Chief Complaint   Patient presents with   • Menstrual Problem     History of Present Illness       Abnormal uterine bleeding. Is getting worked up with Dr Haile.     Anxiety  Much improved.           The following portions of the patient's history were reviewed and updated as appropriate: allergies, current medications, past family history, past medical history, past social history, past surgical history and problem list.    Review of Systems   Constitutional: Negative for diaphoresis and fatigue.   Psychiatric/Behavioral: Negative for self-injury and sleep disturbance. The patient is not nervous/anxious.    All other systems reviewed and are negative.      Current Outpatient Medications:   •  albuterol sulfate  (90 Base) MCG/ACT inhaler, Inhale 2 puffs Every 4 (Four) Hours As Needed for Wheezing or Shortness of Air., Disp: 18 g, Rfl: 1  •  buPROPion SR (WELLBUTRIN SR) 150 MG 12 hr tablet, TAKE ONE TABLET BY MOUTH TWICE A DAY, Disp: 180 tablet, Rfl: 3  •  escitalopram (Lexapro) 20 MG tablet, Take 1 tablet by mouth Daily., Disp: 90 tablet, Rfl: 3  •  hydroCHLOROthiazide (HYDRODIURIL) 12.5 MG tablet, TAKE ONE TABLET BY MOUTH DAILY, Disp: 30 tablet, Rfl: 10  •  ipratropium (ATROVENT) 0.03 % nasal spray, ipratropium bromide 0.03 % nasal spray, Disp: , Rfl:   •  levothyroxine (SYNTHROID, LEVOTHROID) 50 MCG tablet, TAKE ONE TABLET BY MOUTH DAILY, Disp: 90 tablet, Rfl: 10  •  Multiple Vitamin (MULTI-VITAMIN DAILY) tablet, Take  by mouth Daily., Disp: , Rfl:   •  Omega-3 Fatty Acids (fish oil) 1000 MG capsule capsule, Take  by mouth Daily With Breakfast., Disp: , Rfl:   •  B Complex-C (B-complex with vitamin C) tablet, Take 1 tablet by mouth Daily., Disp: 90 tablet, Rfl: 3  •  levothyroxine (Synthroid) 75 MCG tablet, Take 1 tablet by mouth Daily., Disp: 90 tablet, Rfl: 3    Objective     "  Blood pressure 120/80, pulse 84, temperature 98.2 °F (36.8 °C), resp. rate 16, height 152.4 cm (60\"), weight 76.2 kg (168 lb), last menstrual period 04/11/2021, SpO2 98 %, not currently breastfeeding.    Physical Exam     General Appearance:    Alert, cooperative, no distress, appears stated age   Head:    Normocephalic, without obvious abnormality, atraumatic   Eyes:    PERRL, conjunctiva/corneas clear, EOM's intact   Ears:    Normal TM's and external ear canals, both ears   Nose:   Nares normal, septum midline, mucosa normal, no drainage   or sinus tenderness   Throat:   Lips, mucosa, and tongue normal; teeth and gums normal   Neck:   Supple, symmetrical, trachea midline, no adenopathy;        thyroid:  No enlargement/tenderness/nodules; no carotid    bruit or JVD   Back:     Symmetric, no curvature, ROM normal, no CVA tenderness   Lungs:     Clear to auscultation bilaterally, respirations unlabored   Chest wall:    No tenderness or deformity   Heart:    Regular rate and rhythm, S1 and S2 normal, no murmur,        rub or gallop   Abdomen:     Soft, non-tender, bowel sounds active all four quadrants,     no masses, no organomegaly   Extremities:   Extremities normal, atraumatic, no cyanosis or edema   Pulses:   2+ and symmetric all extremities   Skin:   Skin color, texture, turgor normal, no rashes or lesions   Lymph nodes:   Cervical, supraclavicular, and axillary nodes normal   Neurologic:   CNII-XII intact. Normal strength, sensation and reflexes       throughout      Results for orders placed or performed in visit on 03/08/21   TSH    Specimen: Blood   Result Value Ref Range    TSH 2.630 0.270 - 4.200 uIU/mL   Testosterone    Specimen: Blood   Result Value Ref Range    Testosterone, Total 12 8 - 48 ng/dL   Vitamin B12    Specimen: Blood   Result Value Ref Range    Vitamin B-12 684 211 - 946 pg/mL   Folate    Specimen: Blood   Result Value Ref Range    Folate >20.00 4.78 - 24.20 ng/mL   Vitamin D 25 Hydroxy    " Specimen: Blood   Result Value Ref Range    25 Hydroxy, Vitamin D 39.4 30.0 - 100.0 ng/ml   Follicle Stimulating Hormone    Specimen: Blood   Result Value Ref Range    FSH 2.8 mIU/mL   Estradiol    Specimen: Blood   Result Value Ref Range    Estradiol 165.0 pg/mL   CBC & Differential    Specimen: Blood   Result Value Ref Range    WBC 6.75 3.40 - 10.80 10*3/mm3    RBC 4.05 3.77 - 5.28 10*6/mm3    Hemoglobin 11.1 (L) 12.0 - 15.9 g/dL    Hematocrit 35.3 34.0 - 46.6 %    MCV 87.2 79.0 - 97.0 fL    MCH 27.4 26.6 - 33.0 pg    MCHC 31.4 (L) 31.5 - 35.7 g/dL    RDW 14.3 12.3 - 15.4 %    Platelets 365 140 - 450 10*3/mm3    Neutrophil Rel % 62.4 42.7 - 76.0 %    Lymphocyte Rel % 20.4 19.6 - 45.3 %    Monocyte Rel % 10.1 5.0 - 12.0 %    Eosinophil Rel % 5.9 0.3 - 6.2 %    Basophil Rel % 0.9 0.0 - 1.5 %    Neutrophils Absolute 4.21 1.70 - 7.00 10*3/mm3    Lymphocytes Absolute 1.38 0.70 - 3.10 10*3/mm3    Monocytes Absolute 0.68 0.10 - 0.90 10*3/mm3    Eosinophils Absolute 0.40 0.00 - 0.40 10*3/mm3    Basophils Absolute 0.06 0.00 - 0.20 10*3/mm3    Immature Granulocyte Rel % 0.3 0.0 - 0.5 %    Immature Grans Absolute 0.02 0.00 - 0.05 10*3/mm3    nRBC 0.0 0.0 - 0.2 /100 WBC         Assessment/Plan     Pt with AUB started in sept or oct.     covid vac done x 2 recently.    Will start iron replacement pre Dr Haile. Pt is mild. Pt til try qod dosing with vit c. As this will help absorption.    Start b complex with c.    Increase levothyroxin pt with symptoms of low functioning thyroid and tsh 2.6  Will get tsh  Closer to 0.2                 Diagnoses and all orders for this visit:    1. Anxiety (Primary)  -     TSH  -     T4, Free    2. Acquired hypothyroidism  -     TSH  -     T4, Free  -     US Thyroid    3. Iron deficiency anemia due to chronic blood loss  -     TSH  -     T4, Free    Other orders  -     levothyroxine (Synthroid) 75 MCG tablet; Take 1 tablet by mouth Daily.  Dispense: 90 tablet; Refill: 3  -     B Complex-C  (B-complex with vitamin C) tablet; Take 1 tablet by mouth Daily.  Dispense: 90 tablet; Refill: 3  -     escitalopram (Lexapro) 20 MG tablet; Take 1 tablet by mouth Daily.  Dispense: 90 tablet; Refill: 3      Return in about 6 weeks (around 6/3/2021).          There are no Patient Instructions on file for this visit.     Quinten Pal MD    Assessment/Plan

## 2021-05-02 DIAGNOSIS — I10 ESSENTIAL HYPERTENSION: ICD-10-CM

## 2021-05-03 ENCOUNTER — APPOINTMENT (OUTPATIENT)
Dept: ULTRASOUND IMAGING | Facility: HOSPITAL | Age: 46
End: 2021-05-03

## 2021-05-03 RX ORDER — HYDROCHLOROTHIAZIDE 12.5 MG/1
TABLET ORAL
Qty: 90 TABLET | Refills: 9 | Status: SHIPPED | OUTPATIENT
Start: 2021-05-03 | End: 2022-07-12

## 2021-08-19 ENCOUNTER — OFFICE VISIT (OUTPATIENT)
Dept: INTERNAL MEDICINE | Facility: CLINIC | Age: 46
End: 2021-08-19

## 2021-08-19 VITALS
OXYGEN SATURATION: 99 % | TEMPERATURE: 98 F | BODY MASS INDEX: 33.18 KG/M2 | SYSTOLIC BLOOD PRESSURE: 118 MMHG | HEART RATE: 78 BPM | WEIGHT: 169 LBS | RESPIRATION RATE: 16 BRPM | DIASTOLIC BLOOD PRESSURE: 82 MMHG | HEIGHT: 60 IN

## 2021-08-19 DIAGNOSIS — E66.09 CLASS 1 OBESITY DUE TO EXCESS CALORIES WITHOUT SERIOUS COMORBIDITY WITH BODY MASS INDEX (BMI) OF 30.0 TO 30.9 IN ADULT: ICD-10-CM

## 2021-08-19 DIAGNOSIS — Z00.00 ROUTINE GENERAL MEDICAL EXAMINATION AT A HEALTH CARE FACILITY: Primary | ICD-10-CM

## 2021-08-19 DIAGNOSIS — Z12.11 COLON CANCER SCREENING: ICD-10-CM

## 2021-08-19 PROCEDURE — 99396 PREV VISIT EST AGE 40-64: CPT | Performed by: INTERNAL MEDICINE

## 2021-08-19 NOTE — PROGRESS NOTES
Subjective     Patient ID: Sunitha Gregory is a 46 y.o. female. Patient is here for management of multiple medical problems.     Chief Complaint   Patient presents with   • Follow-up     discuss medications     History of Present Illness       Wt still elevated. Wt down to 162 and then back up.      Needs well ness exam    The following portions of the patient's history were reviewed and updated as appropriate: allergies, current medications, past family history, past medical history, past social history, past surgical history and problem list.    Review of Systems   Constitutional: Negative for fatigue.   Gastrointestinal: Negative for abdominal distention and abdominal pain.   Psychiatric/Behavioral: Negative for self-injury and sleep disturbance.   All other systems reviewed and are negative.      Current Outpatient Medications:   •  albuterol sulfate  (90 Base) MCG/ACT inhaler, Inhale 2 puffs Every 4 (Four) Hours As Needed for Wheezing or Shortness of Air., Disp: 18 g, Rfl: 1  •  B Complex-C (B-complex with vitamin C) tablet, Take 1 tablet by mouth Daily., Disp: 90 tablet, Rfl: 3  •  buPROPion SR (WELLBUTRIN SR) 150 MG 12 hr tablet, TAKE ONE TABLET BY MOUTH TWICE A DAY, Disp: 180 tablet, Rfl: 3  •  escitalopram (Lexapro) 20 MG tablet, Take 1 tablet by mouth Daily., Disp: 90 tablet, Rfl: 3  •  hydroCHLOROthiazide (HYDRODIURIL) 12.5 MG tablet, TAKE ONE TABLET BY MOUTH DAILY, Disp: 90 tablet, Rfl: 9  •  ipratropium (ATROVENT) 0.03 % nasal spray, ipratropium bromide 0.03 % nasal spray, Disp: , Rfl:   •  levothyroxine (Synthroid) 75 MCG tablet, Take 1 tablet by mouth Daily., Disp: 90 tablet, Rfl: 3  •  levothyroxine (SYNTHROID, LEVOTHROID) 50 MCG tablet, TAKE ONE TABLET BY MOUTH DAILY, Disp: 90 tablet, Rfl: 10  •  Multiple Vitamin (MULTI-VITAMIN DAILY) tablet, Take  by mouth Daily., Disp: , Rfl:   •  Omega-3 Fatty Acids (fish oil) 1000 MG capsule capsule, Take  by mouth Daily With Breakfast., Disp: , Rfl:  "    Objective      Blood pressure 118/82, pulse 78, temperature 98 °F (36.7 °C), temperature source Temporal, resp. rate 16, height 152.4 cm (60\"), weight 76.7 kg (169 lb), SpO2 99 %, not currently breastfeeding.    Physical Exam     General Appearance:    Alert, cooperative, no distress, appears stated age   Head:    Normocephalic, without obvious abnormality, atraumatic   Eyes:    PERRL, conjunctiva/corneas clear, EOM's intact   Ears:    Normal TM's and external ear canals, both ears   Nose:   Nares normal, septum midline, mucosa normal, no drainage   or sinus tenderness   Throat:   Lips, mucosa, and tongue normal; teeth and gums normal   Neck:   Supple, symmetrical, trachea midline, no adenopathy;        thyroid:  No enlargement/tenderness/nodules; no carotid    bruit or JVD   Back:     Symmetric, no curvature, ROM normal, no CVA tenderness   Lungs:     Clear to auscultation bilaterally, respirations unlabored   Chest wall:    No tenderness or deformity   Heart:    Regular rate and rhythm, S1 and S2 normal, no murmur,        rub or gallop   Abdomen:     Soft, non-tender, bowel sounds active all four quadrants,     no masses, no organomegaly   Extremities:   Extremities normal, atraumatic, no cyanosis or edema   Pulses:   2+ and symmetric all extremities   Skin:   Skin color, texture, turgor normal, no rashes or lesions   Lymph nodes:   Cervical, supraclavicular, and axillary nodes normal   Neurologic:   CNII-XII intact. Normal strength, sensation and reflexes       throughout      Results for orders placed or performed in visit on 03/08/21   TSH    Specimen: Blood   Result Value Ref Range    TSH 2.630 0.270 - 4.200 uIU/mL   Testosterone    Specimen: Blood   Result Value Ref Range    Testosterone, Total 12 8 - 48 ng/dL   Vitamin B12    Specimen: Blood   Result Value Ref Range    Vitamin B-12 684 211 - 946 pg/mL   Folate    Specimen: Blood   Result Value Ref Range    Folate >20.00 4.78 - 24.20 ng/mL   Vitamin D 25 " Hydroxy    Specimen: Blood   Result Value Ref Range    25 Hydroxy, Vitamin D 39.4 30.0 - 100.0 ng/ml   Follicle Stimulating Hormone    Specimen: Blood   Result Value Ref Range    FSH 2.8 mIU/mL   Estradiol    Specimen: Blood   Result Value Ref Range    Estradiol 165.0 pg/mL   CBC & Differential    Specimen: Blood   Result Value Ref Range    WBC 6.75 3.40 - 10.80 10*3/mm3    RBC 4.05 3.77 - 5.28 10*6/mm3    Hemoglobin 11.1 (L) 12.0 - 15.9 g/dL    Hematocrit 35.3 34.0 - 46.6 %    MCV 87.2 79.0 - 97.0 fL    MCH 27.4 26.6 - 33.0 pg    MCHC 31.4 (L) 31.5 - 35.7 g/dL    RDW 14.3 12.3 - 15.4 %    Platelets 365 140 - 450 10*3/mm3    Neutrophil Rel % 62.4 42.7 - 76.0 %    Lymphocyte Rel % 20.4 19.6 - 45.3 %    Monocyte Rel % 10.1 5.0 - 12.0 %    Eosinophil Rel % 5.9 0.3 - 6.2 %    Basophil Rel % 0.9 0.0 - 1.5 %    Neutrophils Absolute 4.21 1.70 - 7.00 10*3/mm3    Lymphocytes Absolute 1.38 0.70 - 3.10 10*3/mm3    Monocytes Absolute 0.68 0.10 - 0.90 10*3/mm3    Eosinophils Absolute 0.40 0.00 - 0.40 10*3/mm3    Basophils Absolute 0.06 0.00 - 0.20 10*3/mm3    Immature Granulocyte Rel % 0.3 0.0 - 0.5 %    Immature Grans Absolute 0.02 0.00 - 0.05 10*3/mm3    nRBC 0.0 0.0 - 0.2 /100 WBC         Assessment/Plan     anxiety is less.  Pt not eating a lot.     Will need to increase.              Diagnoses and all orders for this visit:    1. Routine general medical examination at a health care facility (Primary)    2. Colon cancer screening  -     Cologuard - Stool, Per Rectum; Future    3. Class 1 obesity due to excess calories without serious comorbidity with body mass index (BMI) of 30.0 to 30.9 in adult      Return in about 6 months (around 2/19/2022).          There are no Patient Instructions on file for this visit.     Quinten Pal MD    Assessment/Plan

## 2021-09-29 DIAGNOSIS — F41.9 ANXIETY: Primary | ICD-10-CM

## 2021-09-29 RX ORDER — ALPRAZOLAM 0.25 MG/1
0.25 TABLET ORAL 2 TIMES DAILY PRN
Qty: 4 TABLET | Refills: 0 | Status: SHIPPED | OUTPATIENT
Start: 2021-09-29 | End: 2022-02-25

## 2021-12-23 ENCOUNTER — APPOINTMENT (OUTPATIENT)
Dept: MAMMOGRAPHY | Facility: HOSPITAL | Age: 46
End: 2021-12-23

## 2022-02-09 ENCOUNTER — TELEPHONE (OUTPATIENT)
Dept: INTERNAL MEDICINE | Facility: CLINIC | Age: 47
End: 2022-02-09

## 2022-02-09 NOTE — TELEPHONE ENCOUNTER
Called patient, asked to reschedule appointment patient was scheduled for on 02/21/2022 to 02/25/2022

## 2022-02-21 LAB
T4 FREE SERPL-MCNC: 1.29 NG/DL (ref 0.93–1.7)
TSH SERPL DL<=0.005 MIU/L-ACNC: 1.19 UIU/ML (ref 0.27–4.2)

## 2022-02-25 ENCOUNTER — OFFICE VISIT (OUTPATIENT)
Dept: INTERNAL MEDICINE | Facility: CLINIC | Age: 47
End: 2022-02-25

## 2022-02-25 VITALS
SYSTOLIC BLOOD PRESSURE: 110 MMHG | BODY MASS INDEX: 32 KG/M2 | HEIGHT: 60 IN | HEART RATE: 99 BPM | DIASTOLIC BLOOD PRESSURE: 70 MMHG | TEMPERATURE: 98.4 F | RESPIRATION RATE: 16 BRPM | WEIGHT: 163 LBS | OXYGEN SATURATION: 99 %

## 2022-02-25 DIAGNOSIS — E03.9 ACQUIRED HYPOTHYROIDISM: Primary | ICD-10-CM

## 2022-02-25 DIAGNOSIS — F41.9 ANXIETY: ICD-10-CM

## 2022-02-25 DIAGNOSIS — E66.09 CLASS 1 OBESITY DUE TO EXCESS CALORIES WITHOUT SERIOUS COMORBIDITY WITH BODY MASS INDEX (BMI) OF 30.0 TO 30.9 IN ADULT: ICD-10-CM

## 2022-02-25 DIAGNOSIS — F41.9 ANXIETY DISORDER, UNSPECIFIED TYPE: ICD-10-CM

## 2022-02-25 DIAGNOSIS — I10 ESSENTIAL HYPERTENSION: ICD-10-CM

## 2022-02-25 PROCEDURE — 99214 OFFICE O/P EST MOD 30 MIN: CPT | Performed by: INTERNAL MEDICINE

## 2022-02-25 RX ORDER — LEVOTHYROXINE SODIUM 0.07 MG/1
75 TABLET ORAL DAILY
Qty: 90 TABLET | Refills: 3 | Status: SHIPPED | OUTPATIENT
Start: 2022-02-25

## 2022-02-25 RX ORDER — ESCITALOPRAM OXALATE 20 MG/1
20 TABLET ORAL DAILY
Qty: 90 TABLET | Refills: 3 | Status: SHIPPED | OUTPATIENT
Start: 2022-02-25

## 2022-02-25 RX ORDER — BUPROPION HYDROCHLORIDE 150 MG/1
150 TABLET, EXTENDED RELEASE ORAL 2 TIMES DAILY
Qty: 180 TABLET | Refills: 3 | Status: SHIPPED | OUTPATIENT
Start: 2022-02-25 | End: 2023-03-28 | Stop reason: SDUPTHER

## 2022-02-25 NOTE — PROGRESS NOTES
"Subjective     Patient ID: Sunitha Gregory is a 46 y.o. female. Patient is here for management of multiple medical problems.     Chief Complaint   Patient presents with   • Anxiety     History of Present Illness     Grand mother in hospital with covid and very sick.  Overall anxeity improved.    Wt loss going well. On wt watchers.      The following portions of the patient's history were reviewed and updated as appropriate: allergies, current medications, past family history, past medical history, past social history, past surgical history and problem list.    Review of Systems    Current Outpatient Medications:   •  albuterol sulfate  (90 Base) MCG/ACT inhaler, Inhale 2 puffs Every 4 (Four) Hours As Needed for Wheezing or Shortness of Air., Disp: 18 g, Rfl: 1  •  B Complex-C (B-complex with vitamin C) tablet, Take 1 tablet by mouth Daily., Disp: 90 tablet, Rfl: 3  •  buPROPion SR (WELLBUTRIN SR) 150 MG 12 hr tablet, Take 1 tablet by mouth 2 (Two) Times a Day., Disp: 180 tablet, Rfl: 3  •  escitalopram (Lexapro) 20 MG tablet, Take 1 tablet by mouth Daily., Disp: 90 tablet, Rfl: 3  •  hydroCHLOROthiazide (HYDRODIURIL) 12.5 MG tablet, TAKE ONE TABLET BY MOUTH DAILY, Disp: 90 tablet, Rfl: 9  •  ipratropium (ATROVENT) 0.03 % nasal spray, ipratropium bromide 0.03 % nasal spray, Disp: , Rfl:   •  levothyroxine (Synthroid) 75 MCG tablet, Take 1 tablet by mouth Daily., Disp: 90 tablet, Rfl: 3  •  Multiple Vitamin (MULTI-VITAMIN DAILY) tablet, Take  by mouth Daily., Disp: , Rfl:   •  Omega-3 Fatty Acids (fish oil) 1000 MG capsule capsule, Take  by mouth Daily With Breakfast., Disp: , Rfl:     Objective      Blood pressure 110/70, pulse 99, temperature 98.4 °F (36.9 °C), resp. rate 16, height 152.4 cm (60\"), weight 73.9 kg (163 lb), last menstrual period 02/11/2022, SpO2 99 %, not currently breastfeeding.    Physical Exam     General Appearance:    Alert, cooperative, no distress, appears stated age   Head:    " Normocephalic, without obvious abnormality, atraumatic   Eyes:    PERRL, conjunctiva/corneas clear, EOM's intact   Ears:    Normal TM's and external ear canals, both ears   Nose:   Nares normal, septum midline, mucosa normal, no drainage   or sinus tenderness   Throat:   Lips, mucosa, and tongue normal; teeth and gums normal   Neck:   Supple, symmetrical, trachea midline, no adenopathy;        thyroid:  No enlargement/tenderness/nodules; no carotid    bruit or JVD   Back:     Symmetric, no curvature, ROM normal, no CVA tenderness   Lungs:     Clear to auscultation bilaterally, respirations unlabored   Chest wall:    No tenderness or deformity   Heart:    Regular rate and rhythm, S1 and S2 normal, no murmur,        rub or gallop   Abdomen:     Soft, non-tender, bowel sounds active all four quadrants,     no masses, no organomegaly   Extremities:   Extremities normal, atraumatic, no cyanosis or edema   Pulses:   2+ and symmetric all extremities   Skin:   Skin color, texture, turgor normal, no rashes or lesions   Lymph nodes:   Cervical, supraclavicular, and axillary nodes normal   Neurologic:   CNII-XII intact. Normal strength, sensation and reflexes       throughout      Results for orders placed or performed in visit on 04/22/21   TSH    Specimen: Blood   Result Value Ref Range    TSH 1.190 0.270 - 4.200 uIU/mL   T4, Free    Specimen: Blood   Result Value Ref Range    Free T4 1.29 0.93 - 1.70 ng/dL         Assessment/Plan   Thyroid levels look great. Pt feels well    Anxiety improve. Wt loss going well.    decrase hctz to prn due to low bp from wt loss.      Diagnoses and all orders for this visit:    1. Acquired hypothyroidism (Primary)    2. Anxiety  -     buPROPion SR (WELLBUTRIN SR) 150 MG 12 hr tablet; Take 1 tablet by mouth 2 (Two) Times a Day.  Dispense: 180 tablet; Refill: 3  -     TSH  -     T4, Free  -     Comprehensive Metabolic Panel  -     Vitamin B12  -     CBC & Differential  -     Lipid Panel    3.  Essential hypertension  -     TSH  -     T4, Free  -     Comprehensive Metabolic Panel  -     Vitamin B12  -     CBC & Differential  -     Lipid Panel    4. Anxiety disorder, unspecified type  -     TSH  -     T4, Free  -     Comprehensive Metabolic Panel  -     Vitamin B12  -     CBC & Differential  -     Lipid Panel    5. Class 1 obesity due to excess calories without serious comorbidity with body mass index (BMI) of 30.0 to 30.9 in adult  -     TSH  -     T4, Free  -     Comprehensive Metabolic Panel  -     Vitamin B12  -     CBC & Differential  -     Lipid Panel    Other orders  -     escitalopram (Lexapro) 20 MG tablet; Take 1 tablet by mouth Daily.  Dispense: 90 tablet; Refill: 3  -     levothyroxine (Synthroid) 75 MCG tablet; Take 1 tablet by mouth Daily.  Dispense: 90 tablet; Refill: 3      Return in about 1 year (around 2/25/2023).          There are no Patient Instructions on file for this visit.     Quinten Pal MD    Assessment/Plan

## 2022-04-28 ENCOUNTER — TELEPHONE (OUTPATIENT)
Dept: INTERNAL MEDICINE | Facility: CLINIC | Age: 47
End: 2022-04-28

## 2022-04-28 DIAGNOSIS — R53.83 FATIGUE, UNSPECIFIED TYPE: Primary | ICD-10-CM

## 2022-04-28 DIAGNOSIS — E34.9 ENDOCRINE DISORDER: ICD-10-CM

## 2022-04-28 NOTE — TELEPHONE ENCOUNTER
Patient did not complete  US Thyroid ordered on 4/22/2021. This order is too old to be used and has been cancelled.

## 2022-07-11 DIAGNOSIS — I10 ESSENTIAL HYPERTENSION: ICD-10-CM

## 2022-07-12 RX ORDER — HYDROCHLOROTHIAZIDE 12.5 MG/1
TABLET ORAL
Qty: 90 TABLET | Refills: 9 | Status: SHIPPED | OUTPATIENT
Start: 2022-07-12

## 2022-07-26 ENCOUNTER — OFFICE VISIT (OUTPATIENT)
Dept: ENDOCRINOLOGY | Facility: CLINIC | Age: 47
End: 2022-07-26

## 2022-07-26 ENCOUNTER — LAB (OUTPATIENT)
Dept: LAB | Facility: HOSPITAL | Age: 47
End: 2022-07-26

## 2022-07-26 VITALS
DIASTOLIC BLOOD PRESSURE: 78 MMHG | WEIGHT: 188 LBS | BODY MASS INDEX: 36.91 KG/M2 | HEART RATE: 84 BPM | SYSTOLIC BLOOD PRESSURE: 130 MMHG | HEIGHT: 60 IN | OXYGEN SATURATION: 98 %

## 2022-07-26 DIAGNOSIS — R53.82 CHRONIC FATIGUE: ICD-10-CM

## 2022-07-26 DIAGNOSIS — E03.9 ACQUIRED HYPOTHYROIDISM: ICD-10-CM

## 2022-07-26 DIAGNOSIS — E03.9 ACQUIRED HYPOTHYROIDISM: Primary | ICD-10-CM

## 2022-07-26 PROBLEM — O24.419 GESTATIONAL DIABETES: Status: ACTIVE | Noted: 2022-07-26

## 2022-07-26 LAB
CORTIS SERPL-MCNC: 11.1 MCG/DL
TSH SERPL DL<=0.05 MIU/L-ACNC: 2.51 UIU/ML (ref 0.27–4.2)

## 2022-07-26 PROCEDURE — 80299 QUANTITATIVE ASSAY DRUG: CPT

## 2022-07-26 PROCEDURE — 82533 TOTAL CORTISOL: CPT

## 2022-07-26 PROCEDURE — 84443 ASSAY THYROID STIM HORMONE: CPT

## 2022-07-26 PROCEDURE — 99204 OFFICE O/P NEW MOD 45 MIN: CPT | Performed by: INTERNAL MEDICINE

## 2022-07-26 RX ORDER — DEXAMETHASONE 1 MG
1 TABLET ORAL ONCE
Qty: 1 TABLET | Refills: 0 | Status: SHIPPED | OUTPATIENT
Start: 2022-07-26 | End: 2022-07-26

## 2022-07-26 RX ORDER — AMOXICILLIN AND CLAVULANATE POTASSIUM 500; 125 MG/1; MG/1
TABLET, FILM COATED ORAL 2 TIMES DAILY
COMMUNITY
Start: 2022-07-18

## 2022-07-26 NOTE — PROGRESS NOTES
Office Note      Date: 2022  Patient Name: Sunitha Gregory  MRN: [id number]  : 1975    Chief Complaint   Patient presents with   • Fatigue   • Hypothyroidism       History of Present Illness:   Sunitha Gregory is a 47 y.o. female who presents for Fatigue and Hypothyroidism    She has h/o hypothyroidism for about 3 years.  She denies any h/o hyperthyroidism.  She has been treated with T4.  She is currently taking 75mcg qd.  She is taking this correctly.  She isn't taking any interfering meds concurrently.  She denies any h/o goiter.  She hasn't noted any change in the size of her neck.  She denies any compressive sxs.  She had neck u/s done 2021 that showed normal size thyroid gland with no nodules.  She had labs done 2022.  The TSH was 1.19.  She c/o fatigue.  She notes weight gain.  She notes sweating and heat intolerance.  She notes hair loss.  She notes easy bruising.  She notes some anxiousness.  She denies any steroid use.      Subjective      Patient was born where: [place].  Facial radiation exposure: No.  High iodine intake: No  Family hx of thyroid disease: No.    Review of Systems:   Review of Systems   Constitutional: Positive for activity change, diaphoresis, fatigue and unexpected weight change.   HENT: Positive for dental problem.    Eyes: Negative.    Respiratory: Positive for shortness of breath.    Cardiovascular: Negative.    Gastrointestinal: Negative.    Endocrine: Positive for heat intolerance.   Genitourinary: Positive for dyspareunia, enuresis and menstrual problem.   Musculoskeletal: Negative.    Skin: Negative.         Hair Loss   Allergic/Immunologic: Positive for environmental allergies.   Neurological: Negative.    Hematological: Bruises/bleeds easily.   Psychiatric/Behavioral: Positive for decreased concentration. The patient is nervous/anxious.        The following portions of the patient's history were reviewed and updated as appropriate: allergies, current medications,  "past family history, past medical history, past social history, past surgical history and problem list.    Objective     Visit Vitals  /78   Pulse 84   Ht 152.4 cm (60\")   Wt 85.3 kg (188 lb)   SpO2 98%   BMI 36.72 kg/m²       Physical Exam:  Physical Exam  Constitutional:       Appearance: Normal appearance.   HENT:      Head: Normocephalic and atraumatic.   Eyes:      Extraocular Movements: Extraocular movements intact.      Conjunctiva/sclera: Conjunctivae normal.      Pupils: Pupils are equal, round, and reactive to light.   Neck:      Thyroid: No thyroid mass, thyromegaly or thyroid tenderness.   Cardiovascular:      Rate and Rhythm: Normal rate and regular rhythm.      Pulses: Normal pulses.      Heart sounds: Normal heart sounds.   Pulmonary:      Effort: Pulmonary effort is normal.      Breath sounds: Normal breath sounds.   Abdominal:      General: Bowel sounds are normal.      Palpations: Abdomen is soft.   Musculoskeletal:         General: Normal range of motion.      Cervical back: Normal range of motion and neck supple.   Lymphadenopathy:      Cervical: No cervical adenopathy.   Skin:     General: Skin is warm and dry.      Comments: Nonpigmented striae on lower abdomen   Neurological:      General: No focal deficit present.      Mental Status: She is alert.   Psychiatric:         Mood and Affect: Mood normal.         Behavior: Behavior normal.         Thought Content: Thought content normal.         Judgment: Judgment normal.         Labs:    TSH  No results found for: TSHBASE     Free T4  Free T4   Date Value Ref Range Status   02/21/2022 1.29 0.93 - 1.70 ng/dL Final     Comment:     Results may be falsely increased if patient taking Biotin.       T3  No results found for: U3VNBDT      TPO  No results found for: THYROIDAB    TG AB  No results found for: THGAB    TG  No results found for: THYROGLB    CBC w/DIFF  Lab Results   Component Value Date    WBC 6.75 03/08/2021    RBC 4.05 03/08/2021    " HGB 11.1 (L) 03/08/2021    HCT 35.3 03/08/2021    MCV 87.2 03/08/2021    MCH 27.4 03/08/2021    MCHC 31.4 (L) 03/08/2021    RDW 14.3 03/08/2021     03/08/2021    NEUTRORELPCT 62.4 03/08/2021    LYMPHORELPCT 20.4 03/08/2021    MONORELPCT 10.1 03/08/2021    EOSRELPCT 5.9 03/08/2021    BASORELPCT 0.9 03/08/2021    NEUTROABS 4.21 03/08/2021    LYMPHSABS 1.38 03/08/2021    MONOSABS 0.68 03/08/2021    EOSABS 0.40 03/08/2021    BASOSABS 0.06 03/08/2021    NRBC 0.0 03/08/2021           Assessment / Plan      Assessment & Plan:  Diagnoses and all orders for this visit:    1. Acquired hypothyroidism (Primary)  Assessment & Plan:  TSH has been well controlled.  I don't think this would explain her symptoms.  Continue current T4 dose.  Check TSH today.    Orders:  -     TSH; Future    2. Chronic fatigue  Assessment & Plan:  We discussed doing overnight dexamethasone suppression test to rule out Cushing's syndrome based on her symptoms.      Orders:  -     Cortisol; Future  -     Dexamethasone, Serum; Future    Other orders  -     dexamethasone (DECADRON) 1 MG tablet; Take 1 tablet by mouth 1 (One) Time for 1 dose. Take at 11PM then have fasting labs done 8AM the next day.  Dispense: 1 tablet; Refill: 0       Return for Recheck with TSH.    Arden Gandhi MD   07/26/2022

## 2022-07-26 NOTE — ASSESSMENT & PLAN NOTE
TSH has been well controlled.  I don't think this would explain her symptoms.  Continue current T4 dose.  Check TSH today.

## 2022-07-26 NOTE — ASSESSMENT & PLAN NOTE
We discussed doing overnight dexamethasone suppression test to rule out Cushing's syndrome based on her symptoms.

## 2022-07-27 LAB — DEXAMETHASONE SERPL-MCNC: NORMAL NG/DL

## 2022-10-03 DIAGNOSIS — T75.3XXA MOTION SICKNESS, INITIAL ENCOUNTER: Primary | ICD-10-CM

## 2022-10-03 RX ORDER — ALPRAZOLAM 0.25 MG/1
0.25 TABLET ORAL DAILY PRN
Qty: 4 TABLET | Refills: 0 | Status: SHIPPED | OUTPATIENT
Start: 2022-10-03

## 2022-10-05 ENCOUNTER — TRANSCRIBE ORDERS (OUTPATIENT)
Dept: ADMINISTRATIVE | Facility: HOSPITAL | Age: 47
End: 2022-10-05

## 2022-10-05 DIAGNOSIS — Z12.31 VISIT FOR SCREENING MAMMOGRAM: Primary | ICD-10-CM

## 2022-11-09 ENCOUNTER — HOSPITAL ENCOUNTER (OUTPATIENT)
Dept: MAMMOGRAPHY | Facility: HOSPITAL | Age: 47
Discharge: HOME | End: 2022-11-09
Admitting: OBSTETRICS & GYNECOLOGY

## 2022-11-09 DIAGNOSIS — Z12.31 VISIT FOR SCREENING MAMMOGRAM: ICD-10-CM

## 2022-11-09 PROCEDURE — 77063 BREAST TOMOSYNTHESIS BI: CPT

## 2022-11-09 PROCEDURE — 77067 SCR MAMMO BI INCL CAD: CPT | Performed by: RADIOLOGY

## 2022-11-09 PROCEDURE — 77063 BREAST TOMOSYNTHESIS BI: CPT | Performed by: RADIOLOGY

## 2022-11-09 PROCEDURE — 77067 SCR MAMMO BI INCL CAD: CPT

## 2023-03-28 DIAGNOSIS — F41.9 ANXIETY: ICD-10-CM

## 2023-03-29 RX ORDER — BUPROPION HYDROCHLORIDE 150 MG/1
150 TABLET, EXTENDED RELEASE ORAL 2 TIMES DAILY
Qty: 180 TABLET | Refills: 3 | Status: SHIPPED | OUTPATIENT
Start: 2023-03-29

## 2023-03-29 RX ORDER — BUPROPION HYDROCHLORIDE 150 MG/1
TABLET, EXTENDED RELEASE ORAL
Qty: 180 TABLET | Refills: 3 | OUTPATIENT
Start: 2023-03-29

## 2023-03-29 NOTE — TELEPHONE ENCOUNTER
Rx Refill Note  Requested Prescriptions     Pending Prescriptions Disp Refills   • buPROPion SR (WELLBUTRIN SR) 150 MG 12 hr tablet 180 tablet 3     Sig: Take 1 tablet by mouth 2 (Two) Times a Day.      Last office visit with prescribing clinician: 2/25/2022   Last telemedicine visit with prescribing clinician:   Next office visit with prescribing clinician: 5/23/2023     Nette Ch MA  03/29/23, 14:38 EDT

## 2023-04-10 RX ORDER — ESCITALOPRAM OXALATE 20 MG/1
TABLET ORAL
Qty: 90 TABLET | Refills: 3 | Status: SHIPPED | OUTPATIENT
Start: 2023-04-10

## 2023-04-10 RX ORDER — LEVOTHYROXINE SODIUM 0.07 MG/1
TABLET ORAL
Qty: 90 TABLET | Refills: 3 | Status: SHIPPED | OUTPATIENT
Start: 2023-04-10

## 2023-06-19 ENCOUNTER — OFFICE VISIT (OUTPATIENT)
Dept: OBSTETRICS AND GYNECOLOGY | Facility: CLINIC | Age: 48
End: 2023-06-19
Payer: COMMERCIAL

## 2023-06-19 VITALS
WEIGHT: 207 LBS | DIASTOLIC BLOOD PRESSURE: 84 MMHG | HEIGHT: 60 IN | BODY MASS INDEX: 40.64 KG/M2 | SYSTOLIC BLOOD PRESSURE: 122 MMHG

## 2023-06-19 DIAGNOSIS — Z01.419 WOMEN'S ANNUAL ROUTINE GYNECOLOGICAL EXAMINATION: Primary | ICD-10-CM

## 2023-06-19 PROBLEM — E66.01 MORBID OBESITY WITH BMI OF 40.0-44.9, ADULT: Status: ACTIVE | Noted: 2023-06-19

## 2023-06-19 PROCEDURE — 99396 PREV VISIT EST AGE 40-64: CPT | Performed by: OBSTETRICS & GYNECOLOGY

## 2023-06-19 RX ORDER — MULTIVIT WITH MINERALS/LUTEIN
2100 TABLET ORAL DAILY
COMMUNITY

## 2023-06-19 NOTE — PROGRESS NOTES
Gynecologic Annual Exam Note          GYN Annual Exam     Gynecologic Exam        Subjective     HPI  Sunitha Gregory is a 47 y.o. female, , who presents for annual well woman exam as a established patient . Patient's last menstrual period was 2023..  Patient reports problems with:  difficulty losing weight. She has been dieting and exercising 3 times per week. Also reports hair loss the last few weeks. Also c/o frequent diarrhea after eating. She has not been evaluated for this as it just started happening a few weeks ago. It started as occasional loose stools but over the last week it has been more loose/watery after every meal. Also c/o swelling in her hands. She sees her PCP the first of August .  Her periods occur every 25-35 days , lasting 5 days. The flow is heavy saturating a pad/tampon every 1 hours. This heavy bleeding lasts for 1 days of her period... She reports dysmenorrhea is none. Partner Status: Marital Status: . She is is sexually active. She has not had new partners. STD testing recommendations have been explained to the patient and she does not desire STD testing. There were no changes to her medical or surgical history since her last visit..       Additional OB/GYN History   Current contraception: contraceptive methods: Tubal ligation  Desires to: do not start contraception    Last Pap : 9/10/2020. Result: negative. HPV: not done.   Last Completed Pap Smear            Ordered - PAP SMEAR (Every 3 Years) Ordered on 2023      09/10/2020  Pap IG, Rfx HPV ASCU    2019  Done - Dr Haile                  History of abnormal Pap smear: no  Family history of uterine, colon, breast, or ovarian cancer: yes - paternal uncle with colon cancer  Performs monthly Self-Breast Exam: yes  Last mammogram: 22. Done at .    Last Completed Mammogram            Ordered - MAMMOGRAM (Yearly) Ordered on 2022  Mammo Screening Digital Tomosynthesis Bilateral  With CAD    2020  Mammo Screening Digital Tomosynthesis Bilateral With CAD    2019  Mammo Screening Digital Tomosynthesis Bilateral With CAD    2014  MAMMOGRAPHY SCREENING BILATERAL                        Colonoscopy: has never had a colonoscopy. Cologuard in  was negative  Exercises Regularly: yes  Feelings of Anxiety or Depression: yes - anxiety  Tobacco Usage?: No       Current Outpatient Medications:     albuterol sulfate  (90 Base) MCG/ACT inhaler, Inhale 2 puffs Every 4 (Four) Hours As Needed for Wheezing or Shortness of Air., Disp: 18 g, Rfl: 1    ALPRAZolam (Xanax) 0.25 MG tablet, Take 1 tablet by mouth Daily As Needed for Anxiety (30 min prior to travel)., Disp: 4 tablet, Rfl: 0    ASHWAGANDHA PO, Take  by mouth., Disp: , Rfl:     buPROPion SR (WELLBUTRIN SR) 150 MG 12 hr tablet, Take 1 tablet by mouth 2 (Two) Times a Day., Disp: 180 tablet, Rfl: 3    escitalopram (LEXAPRO) 20 MG tablet, TAKE ONE TABLET BY MOUTH DAILY, Disp: 90 tablet, Rfl: 3    hydroCHLOROthiazide (HYDRODIURIL) 12.5 MG tablet, TAKE ONE TABLET BY MOUTH DAILY, Disp: 90 tablet, Rfl: 9    levothyroxine (SYNTHROID, LEVOTHROID) 75 MCG tablet, TAKE ONE TABLET BY MOUTH DAILY, Disp: 90 tablet, Rfl: 3    Menaquinone-7 (VITAMIN K2 PO), Take  by mouth., Disp: , Rfl:     Multiple Vitamin (MULTI-VITAMIN DAILY) tablet, Take  by mouth Daily., Disp: , Rfl:     NON FORMULARY, Stasis Hormone support for women, Disp: , Rfl:     vitamin C (ASCORBIC ACID) 250 MG tablet, Take 2,100 mg by mouth Daily., Disp: , Rfl:     vitamin D3 125 MCG (5000 UT) capsule capsule, Take 1 capsule by mouth Daily., Disp: , Rfl:      Patient denies the need for medication refills today.    OB History          3    Para   3    Term   3       0    AB   0    Living   3         SAB   0    IAB   0    Ectopic   0    Molar   0    Multiple   0    Live Births   3                Past Medical History:   Diagnosis Date    Anxiety     Asthma      "Gestational diabetes     History of gestational diabetes     Hypothyroidism         Past Surgical History:   Procedure Laterality Date     SECTION       SECTION WITH TUBAL      MOUTH SURGERY      TUBAL ABDOMINAL LIGATION  2016       Health Maintenance   Topic Date Due    Hepatitis B (1 of 3 - 3-dose series) Never done    Pneumococcal Vaccine 0-64 (1 - PCV) Never done    HEPATITIS C SCREENING  Never done    COVID-19 Vaccine (3 - Pfizer series) 2021    LIPID PANEL  2021    Annual Gynecologic Pelvic and Breast Exam  2021    ANNUAL PHYSICAL  2022    PAP SMEAR  09/10/2023    INFLUENZA VACCINE  10/01/2023    MAMMOGRAM  2023    COLORECTAL CANCER SCREENING  2024    TDAP/TD VACCINES (2 - Td or Tdap) 2029       The additional following portions of the patient's history were reviewed and updated as appropriate: allergies, current medications, past family history, past medical history, past social history, past surgical history, and problem list.    Review of Systems   Constitutional: Negative.         Difficultly losing weight     HENT: Negative.     Eyes: Negative.    Respiratory: Negative.     Cardiovascular: Negative.    Gastrointestinal:  Positive for diarrhea.   Endocrine:        Hair loss   Genitourinary: Negative.    Musculoskeletal: Negative.    Skin: Negative.    Allergic/Immunologic: Negative.    Neurological: Negative.    Hematological: Negative.    Psychiatric/Behavioral: Negative.         I have reviewed and agree with the HPI, ROS, and historical information as entered above. Lissette Cole MD          Objective   /84   Ht 152.4 cm (60\")   Wt 93.9 kg (207 lb)   LMP 2023   BMI 40.43 kg/m²     Physical Exam  Vitals and nursing note reviewed. Exam conducted with a chaperone present.   Constitutional:       Appearance: She is well-developed.   HENT:      Head: Normocephalic and atraumatic.   Neck:      Thyroid: No thyroid mass or " thyromegaly.   Cardiovascular:      Rate and Rhythm: Normal rate and regular rhythm.      Heart sounds: No murmur heard.  Pulmonary:      Effort: Pulmonary effort is normal. No retractions.      Breath sounds: Normal breath sounds. No wheezing, rhonchi or rales.   Chest:      Chest wall: No mass or tenderness.   Breasts:     Right: Normal. No mass, nipple discharge, skin change or tenderness.      Left: Normal. No mass, nipple discharge, skin change or tenderness.   Abdominal:      General: Bowel sounds are normal.      Palpations: Abdomen is soft. Abdomen is not rigid. There is no mass.      Tenderness: There is no abdominal tenderness. There is no guarding.      Hernia: No hernia is present. There is no hernia in the left inguinal area or right inguinal area.   Genitourinary:     General: Normal vulva.      Exam position: Lithotomy position.      Pubic Area: No rash.       Labia:         Right: No rash, tenderness or lesion.         Left: No rash, tenderness or lesion.       Urethra: No urethral pain or urethral swelling.      Vagina: Normal. No vaginal discharge or lesions.      Cervix: No cervical motion tenderness, discharge, lesion or cervical bleeding.      Uterus: Normal. Not enlarged, not fixed and not tender.       Adnexa:         Right: No mass, tenderness or fullness.          Left: No mass, tenderness or fullness.        Rectum: No external hemorrhoid.   Musculoskeletal:      Cervical back: Normal range of motion. No muscular tenderness.   Neurological:      Mental Status: She is alert and oriented to person, place, and time.   Psychiatric:         Behavior: Behavior normal.          Assessment and Plan    Problem List Items Addressed This Visit    None  Visit Diagnoses       Women's annual routine gynecological examination    -  Primary    Relevant Orders    LIQUID-BASED PAP SMEAR WITH HPV GENOTYPING REGARDLESS OF INTERPRETATION ([facility]IRA,[facility])    Mammo Screening Digital Tomosynthesis Bilateral With CAD             GYN annual well woman exam.   Pap guidelines reviewed.  Discussed obesity.  Offered lab draw today.  Patient reports she would like to do her labs with her PCP as he has to draw for her thyroid disease.  We also discussed a colonoscopy with her history of bowel habit changes.  She is going to talk to her PCP about this as well.  We discussed the FODMAP diet or protein centric diet to help work on weight loss.  Patient reviewed exercise and my fitness pal.  Reviewed monthly self breast exams.  Instructed to call with lumps, pain, or breast discharge.    Ordered Mammogram today  Recommended use of Vitamin D replacement and getting adequate calcium in her diet. (1500mg)  Reviewed BMI and weight loss as preventative health measures.   Reviewed exercise as a preventative health measures.   Reccommended Flu Vaccine in Fall of each year.  Symptoms of menopausal transition reviewed with patient.   RTC in 1 year or PRN with problems.  Discussed importance of routine colon cancer screening. Reviewed current guidelines. Recommended colonoscopy after age 45.  Return in about 1 year (around 6/19/2024) for Annual physical.     Lissette Cole MD  06/19/2023

## 2023-06-20 LAB — REF LAB TEST METHOD: NORMAL

## 2023-07-24 DIAGNOSIS — Z00.00 ROUTINE GENERAL MEDICAL EXAMINATION AT A HEALTH CARE FACILITY: ICD-10-CM

## 2023-07-24 DIAGNOSIS — E34.9 ENDOCRINE DISORDER: ICD-10-CM

## 2023-07-24 DIAGNOSIS — E03.9 ACQUIRED HYPOTHYROIDISM: ICD-10-CM

## 2023-07-24 DIAGNOSIS — E66.09 CLASS 1 OBESITY DUE TO EXCESS CALORIES WITHOUT SERIOUS COMORBIDITY WITH BODY MASS INDEX (BMI) OF 30.0 TO 30.9 IN ADULT: Primary | ICD-10-CM

## 2023-08-03 LAB
ALBUMIN SERPL-MCNC: 3.7 G/DL (ref 3.5–5.2)
ALBUMIN/GLOB SERPL: 1.3 G/DL
ALP SERPL-CCNC: 96 U/L (ref 39–117)
ALT SERPL-CCNC: 78 U/L (ref 1–33)
AST SERPL-CCNC: 72 U/L (ref 1–32)
BASOPHILS # BLD AUTO: ABNORMAL 10*3/UL
BASOPHILS # BLD MANUAL: 0 10*3/MM3 (ref 0–0.2)
BASOPHILS NFR BLD MANUAL: 0 % (ref 0–1.5)
BILIRUB SERPL-MCNC: 0.5 MG/DL (ref 0–1.2)
BUN SERPL-MCNC: 10 MG/DL (ref 6–20)
BUN/CREAT SERPL: 11.8 (ref 7–25)
CALCIUM SERPL-MCNC: 9.2 MG/DL (ref 8.6–10.5)
CHLORIDE SERPL-SCNC: 105 MMOL/L (ref 98–107)
CHOLEST SERPL-MCNC: 179 MG/DL (ref 0–200)
CO2 SERPL-SCNC: 25.4 MMOL/L (ref 22–29)
CREAT SERPL-MCNC: 0.85 MG/DL (ref 0.57–1)
DIFFERENTIAL COMMENT: NORMAL
EGFRCR SERPLBLD CKD-EPI 2021: 84.1 ML/MIN/1.73
EOSINOPHIL # BLD AUTO: ABNORMAL 10*3/UL
EOSINOPHIL # BLD MANUAL: 0.09 10*3/MM3 (ref 0–0.4)
EOSINOPHIL NFR BLD AUTO: ABNORMAL %
EOSINOPHIL NFR BLD MANUAL: 2 % (ref 0.3–6.2)
ERYTHROCYTE [DISTWIDTH] IN BLOOD BY AUTOMATED COUNT: 15.7 % (ref 12.3–15.4)
GLOBULIN SER CALC-MCNC: 2.9 GM/DL
GLUCOSE SERPL-MCNC: 94 MG/DL (ref 65–99)
HBA1C MFR BLD: 5.2 % (ref 4.8–5.6)
HCT VFR BLD AUTO: 35.6 % (ref 34–46.6)
HDLC SERPL-MCNC: 43 MG/DL (ref 40–60)
HGB BLD-MCNC: 11.4 G/DL (ref 12–15.9)
LDLC SERPL CALC-MCNC: 120 MG/DL (ref 0–100)
LYMPHOCYTES # BLD AUTO: ABNORMAL 10*3/UL
LYMPHOCYTES # BLD MANUAL: 1.32 10*3/MM3 (ref 0.7–3.1)
LYMPHOCYTES NFR BLD AUTO: ABNORMAL %
LYMPHOCYTES NFR BLD MANUAL: 28 % (ref 19.6–45.3)
MCH RBC QN AUTO: 26.6 PG (ref 26.6–33)
MCHC RBC AUTO-ENTMCNC: 32 G/DL (ref 31.5–35.7)
MCV RBC AUTO: 83 FL (ref 79–97)
MONOCYTES # BLD MANUAL: 0.52 10*3/MM3 (ref 0.1–0.9)
MONOCYTES NFR BLD AUTO: ABNORMAL %
MONOCYTES NFR BLD MANUAL: 11 % (ref 5–12)
NEUTROPHILS # BLD MANUAL: 2.79 10*3/MM3 (ref 1.7–7)
NEUTROPHILS NFR BLD AUTO: ABNORMAL %
NEUTROPHILS NFR BLD MANUAL: 59 % (ref 42.7–76)
NRBC BLD AUTO-RTO: 0 /100 WBC (ref 0–0.2)
PLATELET # BLD AUTO: 209 10*3/MM3 (ref 140–450)
PLATELET BLD QL SMEAR: NORMAL
POTASSIUM SERPL-SCNC: 3.8 MMOL/L (ref 3.5–5.2)
PROT SERPL-MCNC: 6.6 G/DL (ref 6–8.5)
RBC # BLD AUTO: 4.29 10*6/MM3 (ref 3.77–5.28)
RBC MORPH BLD: NORMAL
SODIUM SERPL-SCNC: 141 MMOL/L (ref 136–145)
T4 FREE SERPL-MCNC: 1.21 NG/DL (ref 0.93–1.7)
TRIGL SERPL-MCNC: 88 MG/DL (ref 0–150)
TSH SERPL DL<=0.005 MIU/L-ACNC: 2.57 UIU/ML (ref 0.27–4.2)
VIT B12 SERPL-MCNC: 918 PG/ML (ref 211–946)
VLDLC SERPL CALC-MCNC: 16 MG/DL (ref 5–40)
WBC # BLD AUTO: 4.73 10*3/MM3 (ref 3.4–10.8)

## 2023-08-07 ENCOUNTER — OFFICE VISIT (OUTPATIENT)
Dept: INTERNAL MEDICINE | Facility: CLINIC | Age: 48
End: 2023-08-07
Payer: COMMERCIAL

## 2023-08-07 VITALS
DIASTOLIC BLOOD PRESSURE: 86 MMHG | BODY MASS INDEX: 37.5 KG/M2 | OXYGEN SATURATION: 97 % | SYSTOLIC BLOOD PRESSURE: 126 MMHG | RESPIRATION RATE: 16 BRPM | WEIGHT: 191 LBS | HEIGHT: 60 IN | TEMPERATURE: 97.5 F | HEART RATE: 94 BPM

## 2023-08-07 DIAGNOSIS — E03.9 ACQUIRED HYPOTHYROIDISM: Primary | ICD-10-CM

## 2023-08-07 DIAGNOSIS — R79.89 ELEVATED LIVER FUNCTION TESTS: ICD-10-CM

## 2023-08-07 PROCEDURE — 99214 OFFICE O/P EST MOD 30 MIN: CPT | Performed by: INTERNAL MEDICINE

## 2023-08-07 NOTE — PROGRESS NOTES
"Subjective     Patient ID: Sunitha Gregory is a 48 y.o. female. Patient is here for management of multiple medical problems.     Chief Complaint   Patient presents with    Annual Exam     History of Present Illness   Annulal exam.   Using Kratom 2 pills a day. X 2 months.          The following portions of the patient's history were reviewed and updated as appropriate: allergies, current medications, past family history, past medical history, past social history, past surgical history and problem list.    Review of Systems    Current Outpatient Medications:     albuterol sulfate  (90 Base) MCG/ACT inhaler, Inhale 2 puffs Every 4 (Four) Hours As Needed for Wheezing or Shortness of Air., Disp: 18 g, Rfl: 1    ALPRAZolam (Xanax) 0.25 MG tablet, Take 1 tablet by mouth Daily As Needed for Anxiety (30 min prior to travel)., Disp: 4 tablet, Rfl: 0    ASHWAGANDHA PO, Take  by mouth., Disp: , Rfl:     buPROPion SR (WELLBUTRIN SR) 150 MG 12 hr tablet, Take 1 tablet by mouth 2 (Two) Times a Day., Disp: 180 tablet, Rfl: 3    escitalopram (LEXAPRO) 20 MG tablet, TAKE ONE TABLET BY MOUTH DAILY, Disp: 90 tablet, Rfl: 3    hydroCHLOROthiazide (HYDRODIURIL) 12.5 MG tablet, TAKE ONE TABLET BY MOUTH DAILY, Disp: 90 tablet, Rfl: 9    levothyroxine (SYNTHROID, LEVOTHROID) 75 MCG tablet, TAKE ONE TABLET BY MOUTH DAILY, Disp: 90 tablet, Rfl: 3    Menaquinone-7 (VITAMIN K2 PO), Take  by mouth., Disp: , Rfl:     Multiple Vitamin (MULTI-VITAMIN DAILY) tablet, Take  by mouth Daily., Disp: , Rfl:     vitamin C (ASCORBIC ACID) 250 MG tablet, Take 2,100 mg by mouth Daily., Disp: , Rfl:     vitamin D3 125 MCG (5000 UT) capsule capsule, Take 1 capsule by mouth Daily., Disp: , Rfl:     Objective      Blood pressure 126/86, pulse 94, temperature 97.5 øF (36.4 øC), resp. rate 16, height 152.4 cm (60\"), weight 86.6 kg (191 lb), SpO2 97 %, not currently breastfeeding.    Physical Exam     General Appearance:    Alert, cooperative, no distress, " appears stated age   Head:    Normocephalic, without obvious abnormality, atraumatic   Eyes:    PERRL, conjunctiva/corneas clear, EOM's intact   Ears:    Normal TM's and external ear canals, both ears   Nose:   Nares normal, septum midline, mucosa normal, no drainage   or sinus tenderness   Throat:   Lips, mucosa, and tongue normal; teeth and gums normal   Neck:   Supple, symmetrical, trachea midline, no adenopathy;        thyroid:  No enlargement/tenderness/nodules; no carotid    bruit or JVD   Back:     Symmetric, no curvature, ROM normal, no CVA tenderness   Lungs:     Clear to auscultation bilaterally, respirations unlabored   Chest wall:    No tenderness or deformity   Heart:    Regular rate and rhythm, S1 and S2 normal, no murmur,        rub or gallop   Abdomen:     Soft, non-tender, bowel sounds active all four quadrants,     no masses, no organomegaly   Extremities:   Extremities normal, atraumatic, no cyanosis or edema   Pulses:   2+ and symmetric all extremities   Skin:   Skin color, texture, turgor normal, no rashes or lesions   Lymph nodes:   Cervical, supraclavicular, and axillary nodes normal   Neurologic:   CNII-XII intact. Normal strength, sensation and reflexes       throughout      Results for orders placed or performed in visit on 07/24/23   Lipid Panel    Specimen: Blood   Result Value Ref Range    Total Cholesterol 179 0 - 200 mg/dL    Triglycerides 88 0 - 150 mg/dL    HDL Cholesterol 43 40 - 60 mg/dL    VLDL Cholesterol Jacoby 16 5 - 40 mg/dL    LDL Chol Calc (NIH) 120 (H) 0 - 100 mg/dL   Vitamin B12    Specimen: Blood   Result Value Ref Range    Vitamin B-12 918 211 - 946 pg/mL   Comprehensive Metabolic Panel    Specimen: Blood   Result Value Ref Range    Glucose 94 65 - 99 mg/dL    BUN 10 6 - 20 mg/dL    Creatinine 0.85 0.57 - 1.00 mg/dL    EGFR Result 84.1 >60.0 mL/min/1.73    BUN/Creatinine Ratio 11.8 7.0 - 25.0    Sodium 141 136 - 145 mmol/L    Potassium 3.8 3.5 - 5.2 mmol/L    Chloride 105  98 - 107 mmol/L    Total CO2 25.4 22.0 - 29.0 mmol/L    Calcium 9.2 8.6 - 10.5 mg/dL    Total Protein 6.6 6.0 - 8.5 g/dL    Albumin 3.7 3.5 - 5.2 g/dL    Globulin 2.9 gm/dL    A/G Ratio 1.3 g/dL    Total Bilirubin 0.5 0.0 - 1.2 mg/dL    Alkaline Phosphatase 96 39 - 117 U/L    AST (SGOT) 72 (H) 1 - 32 U/L    ALT (SGPT) 78 (H) 1 - 33 U/L   T4, Free    Specimen: Blood   Result Value Ref Range    Free T4 1.21 0.93 - 1.70 ng/dL   TSH    Specimen: Blood   Result Value Ref Range    TSH 2.570 0.270 - 4.200 uIU/mL   Hemoglobin A1c    Specimen: Blood   Result Value Ref Range    Hemoglobin A1C 5.20 4.80 - 5.60 %   Manual Differential   Result Value Ref Range    Neutrophil Rel % 59.0 42.7 - 76.0 %    Lymphocyte Rel % 28.0 19.6 - 45.3 %    Monocyte Rel % 11.0 5.0 - 12.0 %    Eosinophil Rel % 2.0 0.3 - 6.2 %    Basophil Rel % 0.0 0.0 - 1.5 %    Neutrophils Absolute 2.79 1.70 - 7.00 10*3/mm3    Lymphocytes Absolute 1.32 0.70 - 3.10 10*3/mm3    Monocytes Absolute 0.52 0.10 - 0.90 10*3/mm3    Eosinophil Abs 0.09 0.00 - 0.40 10*3/mm3    Basophils Absolute 0.00 0.00 - 0.20 10*3/mm3    Differential Comment Comment     Comment Comment     Plt Comment Comment    CBC & Differential    Specimen: Blood   Result Value Ref Range    WBC 4.73 3.40 - 10.80 10*3/mm3    RBC 4.29 3.77 - 5.28 10*6/mm3    Hemoglobin 11.4 (L) 12.0 - 15.9 g/dL    Hematocrit 35.6 34.0 - 46.6 %    MCV 83.0 79.0 - 97.0 fL    MCH 26.6 26.6 - 33.0 pg    MCHC 32.0 31.5 - 35.7 g/dL    RDW 15.7 (H) 12.3 - 15.4 %    Platelets 209 140 - 450 10*3/mm3    Neutrophil Rel %      Lymphocyte Rel %      Monocyte Rel %      Eosinophil Rel %      Lymphocytes Absolute      Eosinophils Absolute      Basophils Absolute      nRBC 0.0 0.0 - 0.2 /100 WBC         Assessment & Plan     Pt with elevated lft. Had been on Kradom in the past with elevated liver enzymes. Unclear if labs form that time match kradom use.    States using for RLS.       Stay on keto diet.    Stop kradom. Recheck  labs.        Diagnoses and all orders for this visit:    1. Acquired hypothyroidism (Primary)    2. Elevated liver function tests  -     Comprehensive Metabolic Panel      No follow-ups on file.          There are no Patient Instructions on file for this visit.     Quinten Pal MD    Assessment & Plan

## 2023-08-09 DIAGNOSIS — R79.89 ELEVATED LFTS: Primary | ICD-10-CM

## 2023-08-09 LAB
ALBUMIN SERPL-MCNC: 3.7 G/DL (ref 3.5–5.2)
ALBUMIN/GLOB SERPL: 1.3 G/DL
ALP SERPL-CCNC: 96 U/L (ref 39–117)
ALT SERPL-CCNC: 78 U/L (ref 1–33)
AST SERPL-CCNC: 72 U/L (ref 1–32)
BASOPHILS # BLD MANUAL: 0 10*3/MM3 (ref 0–0.2)
BASOPHILS NFR BLD MANUAL: 0 % (ref 0–1.5)
BILIRUB SERPL-MCNC: 0.5 MG/DL (ref 0–1.2)
BUN SERPL-MCNC: 10 MG/DL (ref 6–20)
BUN/CREAT SERPL: 11.8 (ref 7–25)
CALCIUM SERPL-MCNC: 9.2 MG/DL (ref 8.6–10.5)
CHLORIDE SERPL-SCNC: 105 MMOL/L (ref 98–107)
CHOLEST SERPL-MCNC: 179 MG/DL (ref 0–200)
CO2 SERPL-SCNC: 25.4 MMOL/L (ref 22–29)
CREAT SERPL-MCNC: 0.85 MG/DL (ref 0.57–1)
DIFFERENTIAL COMMENT: NORMAL
EGFRCR SERPLBLD CKD-EPI 2021: 84.1 ML/MIN/1.73
EOSINOPHIL # BLD MANUAL: 0.09 10*3/MM3 (ref 0–0.4)
EOSINOPHIL NFR BLD MANUAL: 2 % (ref 0.3–6.2)
ERYTHROCYTE [DISTWIDTH] IN BLOOD BY AUTOMATED COUNT: 15.7 % (ref 12.3–15.4)
GLOBULIN SER CALC-MCNC: 2.9 GM/DL
GLUCOSE SERPL-MCNC: 94 MG/DL (ref 65–99)
HBA1C MFR BLD: 5.2 % (ref 4.8–5.6)
HCT VFR BLD AUTO: 35.6 % (ref 34–46.6)
HDLC SERPL-MCNC: 43 MG/DL (ref 40–60)
HGB BLD-MCNC: 11.4 G/DL (ref 12–15.9)
LDLC SERPL CALC-MCNC: 120 MG/DL (ref 0–100)
LYMPHOCYTES # BLD MANUAL: 1.32 10*3/MM3 (ref 0.7–3.1)
LYMPHOCYTES NFR BLD MANUAL: 28 % (ref 19.6–45.3)
MCH RBC QN AUTO: 26.6 PG (ref 26.6–33)
MCHC RBC AUTO-ENTMCNC: 32 G/DL (ref 31.5–35.7)
MCV RBC AUTO: 83 FL (ref 79–97)
MONOCYTES # BLD MANUAL: 0.52 10*3/MM3 (ref 0.1–0.9)
MONOCYTES NFR BLD MANUAL: 11 % (ref 5–12)
NEUTROPHILS # BLD MANUAL: 2.79 10*3/MM3 (ref 1.7–7)
NEUTROPHILS NFR BLD MANUAL: 59 % (ref 42.7–76)
NRBC BLD AUTO-RTO: 0 /100 WBC (ref 0–0.2)
PLATELET # BLD AUTO: 209 10*3/MM3 (ref 140–450)
PLATELET BLD QL SMEAR: NORMAL
POTASSIUM SERPL-SCNC: 3.8 MMOL/L (ref 3.5–5.2)
PROT SERPL-MCNC: 6.6 G/DL (ref 6–8.5)
RBC # BLD AUTO: 4.29 10*6/MM3 (ref 3.77–5.28)
RBC MORPH BLD: NORMAL
SODIUM SERPL-SCNC: 141 MMOL/L (ref 136–145)
T4 FREE SERPL-MCNC: 1.21 NG/DL (ref 0.93–1.7)
TRIGL SERPL-MCNC: 88 MG/DL (ref 0–150)
TSH SERPL DL<=0.005 MIU/L-ACNC: 2.57 UIU/ML (ref 0.27–4.2)
VIT B12 SERPL-MCNC: 918 PG/ML (ref 211–946)
VLDLC SERPL CALC-MCNC: 16 MG/DL (ref 5–40)
WBC # BLD AUTO: 4.73 10*3/MM3 (ref 3.4–10.8)

## 2023-09-11 ENCOUNTER — LAB (OUTPATIENT)
Dept: LAB | Facility: HOSPITAL | Age: 48
End: 2023-09-11
Payer: COMMERCIAL

## 2023-09-11 LAB
ALBUMIN SERPL-MCNC: 4.1 G/DL (ref 3.5–5.2)
ALBUMIN/GLOB SERPL: 1.3 G/DL
ALP SERPL-CCNC: 68 U/L (ref 39–117)
ALT SERPL W P-5'-P-CCNC: 23 U/L (ref 1–33)
ANION GAP SERPL CALCULATED.3IONS-SCNC: 11.4 MMOL/L (ref 5–15)
AST SERPL-CCNC: 23 U/L (ref 1–32)
BILIRUB SERPL-MCNC: 0.7 MG/DL (ref 0–1.2)
BUN SERPL-MCNC: 15 MG/DL (ref 6–20)
BUN/CREAT SERPL: 20.5 (ref 7–25)
CALCIUM SPEC-SCNC: 9.3 MG/DL (ref 8.6–10.5)
CHLORIDE SERPL-SCNC: 103 MMOL/L (ref 98–107)
CO2 SERPL-SCNC: 21.6 MMOL/L (ref 22–29)
CREAT SERPL-MCNC: 0.73 MG/DL (ref 0.57–1)
EGFRCR SERPLBLD CKD-EPI 2021: 101 ML/MIN/1.73
GLOBULIN UR ELPH-MCNC: 3.1 GM/DL
GLUCOSE SERPL-MCNC: 87 MG/DL (ref 65–99)
POTASSIUM SERPL-SCNC: 3.9 MMOL/L (ref 3.5–5.2)
PROT SERPL-MCNC: 7.2 G/DL (ref 6–8.5)
SODIUM SERPL-SCNC: 136 MMOL/L (ref 136–145)

## 2023-09-11 PROCEDURE — 80053 COMPREHEN METABOLIC PANEL: CPT | Performed by: INTERNAL MEDICINE

## 2023-09-20 DIAGNOSIS — T75.3XXA MOTION SICKNESS, INITIAL ENCOUNTER: ICD-10-CM

## 2023-09-20 RX ORDER — ALPRAZOLAM 0.25 MG/1
0.25 TABLET ORAL DAILY PRN
Qty: 4 TABLET | Refills: 0 | OUTPATIENT
Start: 2023-09-20

## 2023-09-20 NOTE — TELEPHONE ENCOUNTER
Rx Refill Note  Requested Prescriptions     Pending Prescriptions Disp Refills    ALPRAZolam (Xanax) 0.25 MG tablet 4 tablet 0     Sig: Take 1 tablet by mouth Daily As Needed for Anxiety (30 min prior to travel).      Last office visit with prescribing clinician: 8/7/2023   Last telemedicine visit with prescribing clinician: Visit date not found   Next office visit with prescribing clinician: Visit date not found   Last lab 08/2023  Last UDS- not on file       Patient comment: We are flying out the end of this month. Would you be able to refill this again for my flight? Is there a way to slightly increase the mg? Thank you

## 2023-09-25 DIAGNOSIS — T75.3XXA MOTION SICKNESS, INITIAL ENCOUNTER: ICD-10-CM

## 2023-09-25 RX ORDER — ALPRAZOLAM 0.25 MG/1
0.25 TABLET ORAL DAILY PRN
Qty: 4 TABLET | Refills: 0 | Status: SHIPPED | OUTPATIENT
Start: 2023-09-25

## 2023-10-04 DIAGNOSIS — I10 ESSENTIAL HYPERTENSION: ICD-10-CM

## 2023-10-04 RX ORDER — HYDROCHLOROTHIAZIDE 12.5 MG/1
TABLET ORAL
Qty: 90 TABLET | Refills: 3 | Status: SHIPPED | OUTPATIENT
Start: 2023-10-04

## 2024-03-21 DIAGNOSIS — F41.9 ANXIETY: ICD-10-CM

## 2024-03-21 RX ORDER — BUPROPION HYDROCHLORIDE 150 MG/1
150 TABLET, EXTENDED RELEASE ORAL 2 TIMES DAILY
Qty: 180 TABLET | Refills: 3 | Status: SHIPPED | OUTPATIENT
Start: 2024-03-21

## 2024-04-03 RX ORDER — LEVOTHYROXINE SODIUM 0.07 MG/1
TABLET ORAL
Qty: 90 TABLET | Refills: 0 | Status: SHIPPED | OUTPATIENT
Start: 2024-04-03

## 2024-04-03 RX ORDER — ESCITALOPRAM OXALATE 20 MG/1
TABLET ORAL
Qty: 90 TABLET | Refills: 0 | Status: SHIPPED | OUTPATIENT
Start: 2024-04-03

## 2024-06-25 ENCOUNTER — OFFICE VISIT (OUTPATIENT)
Dept: OBSTETRICS AND GYNECOLOGY | Facility: CLINIC | Age: 49
End: 2024-06-25
Payer: COMMERCIAL

## 2024-06-25 VITALS
WEIGHT: 207 LBS | HEIGHT: 60 IN | BODY MASS INDEX: 40.64 KG/M2 | DIASTOLIC BLOOD PRESSURE: 86 MMHG | SYSTOLIC BLOOD PRESSURE: 132 MMHG

## 2024-06-25 DIAGNOSIS — Z12.31 BREAST CANCER SCREENING BY MAMMOGRAM: Primary | ICD-10-CM

## 2024-06-25 DIAGNOSIS — Z01.419 WOMEN'S ANNUAL ROUTINE GYNECOLOGICAL EXAMINATION: ICD-10-CM

## 2024-06-25 PROCEDURE — 99396 PREV VISIT EST AGE 40-64: CPT | Performed by: OBSTETRICS & GYNECOLOGY

## 2024-06-25 NOTE — PROGRESS NOTES
Gynecologic Annual Exam Note          GYN Annual Exam     Gynecologic Exam        Subjective     HPI  Sunitha Gregory is a 48 y.o. female, , who presents for annual well woman exam as a established patient. Since her last visit she has started on estroven over the counter .  Patient's last menstrual period was 2024 (exact date).  Her periods occur every 4 weeks-3 months, lasting 5-8 days.  Patient reports once over the past year having a period 2 weeks apart and once going 3 months apart.  Other than that, she reports they are typically monthly.  The flow is heavy (changing pad/tampon every 2-3 hrs) to moderate. She reports dysmenorrhea is mild occurring first 1-2 days of flow. Marital Status: . She is sexually active. She has not had new partners.. STD testing recommendations have been explained to the patient and she declines STD testing.    The patient would like to discuss the following complaints today: Pt reports hot flashes, and states she did not have a period for 3 months and that is why she started the estroven to help with menopause symptoms (hot flashes, fatigue, weight gain). Pt reports mild headaches first day of her period.     Additional OB/GYN History   contraceptive methods: Tubal ligation  Desires to: do not start contraception  History of migraines: no    Last Pap : 2023. Result: negative. HPV: negative.   Last Completed Pap Smear            PAP SMEAR (Every 3 Years) Next due on 2023  LIQUID-BASED PAP SMEAR WITH HPV GENOTYPING REGARDLESS OF INTERPRETATION (SHAKIRA,[facility])    09/10/2020  Pap IG, Rfx HPV ASCU    2019  Done - Dr Haile                  History of abnormal Pap smear: no  Family history of uterine, colon, breast, or ovarian cancer: yes - paternal uncle-colon  Performs monthly Self-Breast Exam: yes  Last mammogram: 2022. Done at . There is a copy in the chart.    Last Completed Mammogram       This patient has no relevant  Health Maintenance data.            Colonoscopy: has had a cologuard 3 years ago  Exercises Regularly: no  Feelings of Anxiety or Depression: yes - anxiety, managed with medication, PCP  Tobacco Usage?: No       Current Outpatient Medications:     albuterol sulfate  (90 Base) MCG/ACT inhaler, Inhale 2 puffs Every 4 (Four) Hours As Needed for Wheezing or Shortness of Air., Disp: 18 g, Rfl: 1    ALPRAZolam (Xanax) 0.25 MG tablet, Take 1 tablet by mouth Daily As Needed for Anxiety (30 min prior to travel)., Disp: 4 tablet, Rfl: 0    buPROPion SR (WELLBUTRIN SR) 150 MG 12 hr tablet, TAKE ONE TABLET BY MOUTH TWICE A DAY, Disp: 180 tablet, Rfl: 3    escitalopram (LEXAPRO) 20 MG tablet, TAKE ONE TABLET BY MOUTH DAILY, Disp: 90 tablet, Rfl: 0    hydroCHLOROthiazide (HYDRODIURIL) 12.5 MG tablet, TAKE ONE TABLET BY MOUTH DAILY, Disp: 90 tablet, Rfl: 3    levothyroxine (SYNTHROID, LEVOTHROID) 75 MCG tablet, TAKE ONE TABLET BY MOUTH DAILY, Disp: 90 tablet, Rfl: 0    Menaquinone-7 (VITAMIN K2 PO), Take  by mouth., Disp: , Rfl:     Multiple Vitamin (MULTI-VITAMIN DAILY) tablet, Take  by mouth Daily., Disp: , Rfl:     Rhubarb (ESTROVEN COMPLETE PO), Take  by mouth., Disp: , Rfl:     vitamin C (ASCORBIC ACID) 250 MG tablet, Take 2,100 mg by mouth Daily., Disp: , Rfl:     vitamin D3 125 MCG (5000 UT) capsule capsule, Take 1 capsule by mouth Daily., Disp: , Rfl:      Patient denies the need for medication refills today.    OB History          3    Para   3    Term   3       0    AB   0    Living   3         SAB   0    IAB   0    Ectopic   0    Molar   0    Multiple   0    Live Births   3                Past Medical History:   Diagnosis Date    Anxiety     Asthma     Gestational diabetes     History of gestational diabetes     Hypothyroidism         Past Surgical History:   Procedure Laterality Date     SECTION       SECTION WITH TUBAL      MOUTH SURGERY      TUBAL ABDOMINAL LIGATION  2016  "      Health Maintenance   Topic Date Due    Pneumococcal Vaccine 0-64 (1 of 2 - PCV) Never done    HEPATITIS C SCREENING  Never done    ANNUAL PHYSICAL  08/19/2022    BMI FOLLOWUP  02/25/2023    COVID-19 Vaccine (3 - 2023-24 season) 09/01/2023    MAMMOGRAM  11/09/2023    Annual Gynecologic Pelvic and Breast Exam  06/20/2024    COLORECTAL CANCER SCREENING  08/30/2024    INFLUENZA VACCINE  08/01/2024    LIPID PANEL  08/02/2024    PAP SMEAR  06/19/2026    TDAP/TD VACCINES (2 - Td or Tdap) 11/20/2029       The additional following portions of the patient's history were reviewed and updated as appropriate: allergies, current medications, past family history, past medical history, past social history, past surgical history, and problem list.    Review of Systems   Constitutional:  Positive for fatigue.   HENT: Negative.     Eyes: Negative.    Respiratory: Negative.     Cardiovascular: Negative.    Gastrointestinal: Negative.    Endocrine: Negative.    Genitourinary:  Positive for menstrual problem.   Musculoskeletal: Negative.    Skin: Negative.    Allergic/Immunologic: Negative.    Neurological: Negative.    Hematological: Negative.    Psychiatric/Behavioral:  The patient is nervous/anxious.          I have reviewed and agree with the HPI, ROS, and historical information as entered above. Lissette Cole MD          Objective   /86   Ht 152.4 cm (60\")   Wt 93.9 kg (207 lb)   LMP 06/13/2024 (Exact Date)   BMI 40.43 kg/m²     Physical Exam  Vitals and nursing note reviewed. Exam conducted with a chaperone present.   Constitutional:       Appearance: She is well-developed.   HENT:      Head: Normocephalic and atraumatic.   Neck:      Thyroid: No thyroid mass or thyromegaly.   Cardiovascular:      Rate and Rhythm: Normal rate and regular rhythm.      Heart sounds: No murmur heard.  Pulmonary:      Effort: Pulmonary effort is normal. No retractions.      Breath sounds: Normal breath sounds. No wheezing, " rhonchi or rales.   Chest:      Chest wall: No mass or tenderness.   Breasts:     Right: Normal. No mass, nipple discharge, skin change or tenderness.      Left: Normal. No mass, nipple discharge, skin change or tenderness.   Abdominal:      General: Bowel sounds are normal.      Palpations: Abdomen is soft. Abdomen is not rigid. There is no mass.      Tenderness: There is no abdominal tenderness. There is no guarding.      Hernia: No hernia is present. There is no hernia in the left inguinal area or right inguinal area.   Genitourinary:     General: Normal vulva.      Exam position: Lithotomy position.      Pubic Area: No rash.       Labia:         Right: No rash, tenderness or lesion.         Left: No rash, tenderness or lesion.       Urethra: No urethral pain or urethral swelling.      Vagina: Normal. No vaginal discharge or lesions.      Cervix: No cervical motion tenderness, discharge, lesion or cervical bleeding.      Uterus: Normal. Not enlarged, not fixed and not tender.       Adnexa:         Right: No mass, tenderness or fullness.          Left: No mass, tenderness or fullness.        Rectum: No external hemorrhoid.   Musculoskeletal:      Cervical back: Normal range of motion. No muscular tenderness.   Neurological:      Mental Status: She is alert and oriented to person, place, and time.   Psychiatric:         Behavior: Behavior normal.            Assessment and Plan    Problem List Items Addressed This Visit          Genitourinary and Reproductive     Breast cancer screening by mammogram - Primary    Overview     Last was 9/17/2019 at [facility] within normal limits         Relevant Orders    Mammo Screening Digital Tomosynthesis Bilateral With CAD     Other Visit Diagnoses       Women's annual routine gynecological examination                GYN annual well woman exam.   Pap guidelines reviewed.  Reviewed monthly self breast exams.  Instructed to call with lumps, pain, or breast discharge.    Ordered  Mammogram today  Recommended use of Vitamin D replacement and getting adequate calcium in her diet. (1500mg)  Reviewed BMI and weight loss as preventative health measures.   Reviewed exercise as a preventative health measures.   Reccommended Flu Vaccine in Fall of each year.  Symptoms of menopausal transition reviewed with patient.   RTC in 1 year or PRN with problems.  Return in about 1 year (around 6/25/2025) for Annual physical.     Lissette Cole MD  06/25/2024

## 2024-07-01 RX ORDER — LEVOTHYROXINE SODIUM 0.07 MG/1
75 TABLET ORAL DAILY
Qty: 90 TABLET | Refills: 0 | Status: SHIPPED | OUTPATIENT
Start: 2024-07-01

## 2024-07-01 RX ORDER — ESCITALOPRAM OXALATE 20 MG/1
20 TABLET ORAL DAILY
Qty: 90 TABLET | Refills: 0 | Status: SHIPPED | OUTPATIENT
Start: 2024-07-01

## 2024-09-05 ENCOUNTER — TELEPHONE (OUTPATIENT)
Dept: INTERNAL MEDICINE | Facility: CLINIC | Age: 49
End: 2024-09-05
Payer: COMMERCIAL

## 2024-09-05 NOTE — TELEPHONE ENCOUNTER
Caller: Sunitha Gregory    Relationship: Self    Best call back number: [phone]     Who is your current provider: DR TAVERAS    Is your current provider offboarding? NO    Who would you like your new provider to be: DR DUARTE    What are your reasons for transferring care: DIFFICULT TO GET IN WITH PCP    Additional notes: THE PATIENT HAS ALREADY DISCUSSED THIS WITH DR DUARTE AND SHE STATED THAT SHE WOULD TAKE THIS PATIENT.

## 2024-09-20 ENCOUNTER — OFFICE VISIT (OUTPATIENT)
Dept: INTERNAL MEDICINE | Facility: CLINIC | Age: 49
End: 2024-09-20
Payer: COMMERCIAL

## 2024-09-20 VITALS
HEIGHT: 60 IN | TEMPERATURE: 98 F | HEART RATE: 88 BPM | DIASTOLIC BLOOD PRESSURE: 82 MMHG | OXYGEN SATURATION: 99 % | WEIGHT: 206 LBS | SYSTOLIC BLOOD PRESSURE: 122 MMHG | BODY MASS INDEX: 40.44 KG/M2

## 2024-09-20 DIAGNOSIS — E03.9 ACQUIRED HYPOTHYROIDISM: ICD-10-CM

## 2024-09-20 DIAGNOSIS — Z13.220 LIPID SCREENING: ICD-10-CM

## 2024-09-20 DIAGNOSIS — F41.9 ANXIETY DISORDER, UNSPECIFIED TYPE: ICD-10-CM

## 2024-09-20 DIAGNOSIS — K21.9 GASTROESOPHAGEAL REFLUX DISEASE, UNSPECIFIED WHETHER ESOPHAGITIS PRESENT: ICD-10-CM

## 2024-09-20 DIAGNOSIS — E66.01 CLASS 3 SEVERE OBESITY WITHOUT SERIOUS COMORBIDITY WITH BODY MASS INDEX (BMI) OF 40.0 TO 44.9 IN ADULT, UNSPECIFIED OBESITY TYPE: ICD-10-CM

## 2024-09-20 DIAGNOSIS — N95.1 PERIMENOPAUSAL: ICD-10-CM

## 2024-09-20 DIAGNOSIS — Z00.00 WELL ADULT EXAM: Primary | ICD-10-CM

## 2024-09-20 DIAGNOSIS — Z12.11 ENCOUNTER FOR SCREENING COLONOSCOPY: ICD-10-CM

## 2024-09-20 DIAGNOSIS — R53.83 FATIGUE, UNSPECIFIED TYPE: ICD-10-CM

## 2024-09-20 PROCEDURE — 99396 PREV VISIT EST AGE 40-64: CPT | Performed by: FAMILY MEDICINE

## 2024-09-20 RX ORDER — PROPRANOLOL HCL 10 MG
10 TABLET ORAL 2 TIMES DAILY
Qty: 180 TABLET | Refills: 1 | Status: SHIPPED | OUTPATIENT
Start: 2024-09-20

## 2024-09-23 ENCOUNTER — TELEPHONE (OUTPATIENT)
Dept: SURGERY | Facility: CLINIC | Age: 49
End: 2024-09-23
Payer: COMMERCIAL

## 2024-09-23 ENCOUNTER — PRIOR AUTHORIZATION (OUTPATIENT)
Dept: INTERNAL MEDICINE | Facility: CLINIC | Age: 49
End: 2024-09-23
Payer: COMMERCIAL

## 2024-09-23 RX ORDER — SEMAGLUTIDE 0.25 MG/.5ML
0.25 INJECTION, SOLUTION SUBCUTANEOUS WEEKLY
Qty: 2 ML | Refills: 1 | Status: SHIPPED | OUTPATIENT
Start: 2024-09-23

## 2024-09-24 ENCOUNTER — PRIOR AUTHORIZATION (OUTPATIENT)
Dept: INTERNAL MEDICINE | Facility: CLINIC | Age: 49
End: 2024-09-24
Payer: COMMERCIAL

## 2024-09-24 RX ORDER — BISACODYL 5 MG/1
TABLET, DELAYED RELEASE ORAL
Qty: 4 TABLET | Refills: 0 | Status: SHIPPED | OUTPATIENT
Start: 2024-09-24

## 2024-09-24 RX ORDER — POLYETHYLENE GLYCOL 3350 17 G/17G
POWDER, FOR SOLUTION ORAL
Qty: 238 EACH | Refills: 0 | Status: SHIPPED | OUTPATIENT
Start: 2024-09-24

## 2024-09-26 ENCOUNTER — TELEPHONE (OUTPATIENT)
Dept: INTERNAL MEDICINE | Facility: CLINIC | Age: 49
End: 2024-09-26
Payer: COMMERCIAL

## 2024-09-26 DIAGNOSIS — D64.9 ANEMIA, UNSPECIFIED TYPE: Primary | ICD-10-CM

## 2024-09-26 LAB
25(OH)D3+25(OH)D2 SERPL-MCNC: 58 NG/ML (ref 30–100)
ALBUMIN SERPL-MCNC: 4.1 G/DL (ref 3.5–5.2)
ALBUMIN/GLOB SERPL: 1.3 G/DL
ALP SERPL-CCNC: 99 U/L (ref 39–117)
ALT SERPL-CCNC: 20 U/L (ref 1–33)
AST SERPL-CCNC: 23 U/L (ref 1–32)
BASOPHILS # BLD AUTO: 0.05 10*3/MM3 (ref 0–0.2)
BASOPHILS NFR BLD AUTO: 0.9 % (ref 0–1.5)
BILIRUB SERPL-MCNC: 0.5 MG/DL (ref 0–1.2)
BUN SERPL-MCNC: 13 MG/DL (ref 6–20)
BUN/CREAT SERPL: 15.5 (ref 7–25)
CALCIUM SERPL-MCNC: 9.3 MG/DL (ref 8.6–10.5)
CHLORIDE SERPL-SCNC: 104 MMOL/L (ref 98–107)
CHOLEST SERPL-MCNC: 247 MG/DL (ref 0–200)
CO2 SERPL-SCNC: 26.7 MMOL/L (ref 22–29)
CREAT SERPL-MCNC: 0.84 MG/DL (ref 0.57–1)
EGFRCR SERPLBLD CKD-EPI 2021: 84.8 ML/MIN/1.73
EOSINOPHIL # BLD AUTO: 0.42 10*3/MM3 (ref 0–0.4)
EOSINOPHIL NFR BLD AUTO: 7.2 % (ref 0.3–6.2)
ERYTHROCYTE [DISTWIDTH] IN BLOOD BY AUTOMATED COUNT: 13.9 % (ref 12.3–15.4)
ESTRADIOL SERPL-MCNC: 10.7 PG/ML
FERRITIN SERPL-MCNC: 11.2 NG/ML (ref 13–150)
FOLATE SERPL-MCNC: 19.9 NG/ML (ref 4.78–24.2)
FSH SERPL-ACNC: 40.3 MIU/ML
GLOBULIN SER CALC-MCNC: 3.1 GM/DL
GLUCOSE SERPL-MCNC: 95 MG/DL (ref 65–99)
HCT VFR BLD AUTO: 35.9 % (ref 34–46.6)
HDLC SERPL-MCNC: 65 MG/DL (ref 40–60)
HGB BLD-MCNC: 11.5 G/DL (ref 12–15.9)
IMM GRANULOCYTES # BLD AUTO: 0.01 10*3/MM3 (ref 0–0.05)
IMM GRANULOCYTES NFR BLD AUTO: 0.2 % (ref 0–0.5)
IRON SATN MFR SERPL: 7 % (ref 20–50)
IRON SERPL-MCNC: 39 MCG/DL (ref 37–145)
LDLC SERPL CALC-MCNC: 165 MG/DL (ref 0–100)
LH SERPL-ACNC: 38.2 MIU/ML
LYMPHOCYTES # BLD AUTO: 1.69 10*3/MM3 (ref 0.7–3.1)
LYMPHOCYTES NFR BLD AUTO: 29 % (ref 19.6–45.3)
MCH RBC QN AUTO: 29.1 PG (ref 26.6–33)
MCHC RBC AUTO-ENTMCNC: 32 G/DL (ref 31.5–35.7)
MCV RBC AUTO: 90.9 FL (ref 79–97)
MONOCYTES # BLD AUTO: 0.56 10*3/MM3 (ref 0.1–0.9)
MONOCYTES NFR BLD AUTO: 9.6 % (ref 5–12)
NEUTROPHILS # BLD AUTO: 3.1 10*3/MM3 (ref 1.7–7)
NEUTROPHILS NFR BLD AUTO: 53.1 % (ref 42.7–76)
NRBC BLD AUTO-RTO: 0 /100 WBC (ref 0–0.2)
PLATELET # BLD AUTO: 351 10*3/MM3 (ref 140–450)
POTASSIUM SERPL-SCNC: 4.7 MMOL/L (ref 3.5–5.2)
PROT SERPL-MCNC: 7.2 G/DL (ref 6–8.5)
RBC # BLD AUTO: 3.95 10*6/MM3 (ref 3.77–5.28)
SODIUM SERPL-SCNC: 141 MMOL/L (ref 136–145)
T4 FREE SERPL-MCNC: 1.07 NG/DL (ref 0.92–1.68)
TESTOST SERPL-MCNC: 7 NG/DL (ref 4–50)
TIBC SERPL-MCNC: 538 MCG/DL
TRIGL SERPL-MCNC: 97 MG/DL (ref 0–150)
TSH SERPL DL<=0.005 MIU/L-ACNC: 1.6 UIU/ML (ref 0.27–4.2)
UIBC SERPL-MCNC: 499 MCG/DL (ref 112–346)
VIT B12 SERPL-MCNC: 631 PG/ML (ref 211–946)
VLDLC SERPL CALC-MCNC: 17 MG/DL (ref 5–40)
WBC # BLD AUTO: 5.83 10*3/MM3 (ref 3.4–10.8)
WRITTEN AUTHORIZATION: NORMAL

## 2024-09-26 RX ORDER — VENLAFAXINE HYDROCHLORIDE 75 MG/1
75 CAPSULE, EXTENDED RELEASE ORAL DAILY
Qty: 90 CAPSULE | Refills: 3 | Status: SHIPPED | OUTPATIENT
Start: 2024-09-26

## 2024-09-26 RX ORDER — VENLAFAXINE HYDROCHLORIDE 37.5 MG/1
37.5 CAPSULE, EXTENDED RELEASE ORAL DAILY
Qty: 7 CAPSULE | Refills: 0 | Status: SHIPPED | OUTPATIENT
Start: 2024-09-26

## 2024-09-26 RX ORDER — ESCITALOPRAM OXALATE 10 MG/1
TABLET ORAL
Qty: 10 TABLET | Refills: 0 | Status: SHIPPED | OUTPATIENT
Start: 2024-09-26

## 2024-09-27 DIAGNOSIS — I10 ESSENTIAL HYPERTENSION: ICD-10-CM

## 2024-09-27 RX ORDER — HYDROCHLOROTHIAZIDE 12.5 MG/1
12.5 TABLET ORAL DAILY
Qty: 90 TABLET | Refills: 3 | Status: SHIPPED | OUTPATIENT
Start: 2024-09-27

## 2024-09-27 RX ORDER — LEVOTHYROXINE SODIUM 75 UG/1
75 TABLET ORAL DAILY
Qty: 90 TABLET | Refills: 0 | Status: SHIPPED | OUTPATIENT
Start: 2024-09-27

## 2024-09-29 PROBLEM — Z00.00 WELL ADULT EXAM: Status: ACTIVE | Noted: 2021-03-19

## 2024-09-29 PROBLEM — E66.01 CLASS 3 SEVERE OBESITY WITHOUT SERIOUS COMORBIDITY WITH BODY MASS INDEX (BMI) OF 40.0 TO 44.9 IN ADULT: Status: ACTIVE | Noted: 2020-09-09

## 2024-09-29 PROBLEM — E66.813 CLASS 3 SEVERE OBESITY WITHOUT SERIOUS COMORBIDITY WITH BODY MASS INDEX (BMI) OF 40.0 TO 44.9 IN ADULT: Status: ACTIVE | Noted: 2020-09-09

## 2024-10-01 DIAGNOSIS — Z12.11 ENCOUNTER FOR SCREENING COLONOSCOPY: Primary | ICD-10-CM

## 2024-10-01 RX ORDER — SODIUM CHLORIDE, SODIUM LACTATE, POTASSIUM CHLORIDE, CALCIUM CHLORIDE 600; 310; 30; 20 MG/100ML; MG/100ML; MG/100ML; MG/100ML
50 INJECTION, SOLUTION INTRAVENOUS CONTINUOUS
OUTPATIENT
Start: 2024-10-01

## 2024-10-01 RX ORDER — SODIUM CHLORIDE 0.9 % (FLUSH) 0.9 %
10 SYRINGE (ML) INJECTION EVERY 12 HOURS SCHEDULED
OUTPATIENT
Start: 2024-10-01

## 2024-10-01 RX ORDER — SODIUM CHLORIDE 0.9 % (FLUSH) 0.9 %
10 SYRINGE (ML) INJECTION AS NEEDED
OUTPATIENT
Start: 2024-10-01

## 2024-10-01 RX ORDER — SODIUM CHLORIDE 9 MG/ML
40 INJECTION, SOLUTION INTRAVENOUS AS NEEDED
OUTPATIENT
Start: 2024-10-01

## 2024-10-03 RX ORDER — VENLAFAXINE HYDROCHLORIDE 37.5 MG/1
CAPSULE, EXTENDED RELEASE ORAL
Qty: 7 CAPSULE | Refills: 0 | Status: SHIPPED | OUTPATIENT
Start: 2024-10-03

## 2024-10-10 RX ORDER — ESCITALOPRAM OXALATE 10 MG/1
TABLET ORAL
Qty: 11 TABLET | OUTPATIENT
Start: 2024-10-10

## 2024-10-16 PROBLEM — Z12.11 ENCOUNTER FOR SCREENING COLONOSCOPY: Status: ACTIVE | Noted: 2024-10-01

## 2024-11-07 RX ORDER — FERROUS SULFATE 324(65)MG
324 TABLET, DELAYED RELEASE (ENTERIC COATED) ORAL
COMMUNITY

## 2024-11-07 NOTE — PRE-PROCEDURE INSTRUCTIONS
PAT phone history completed with patient for upcoming procedure on 11/12/24.    PAT PASS reviewed with patient and he/she verbalized understanding of the following:     Do not eat or drink anything after midnight the night before procedure unless otherwise instructed by physician/surgeon's office, this includes no gum, candy, mints, tobacco products or e-cigarettes.  Do not shave the area to be operated on at least 48 hours prior to procedure.  Do not wear makeup, lotion, hair products, or nail polish.  Do not wear any jewelry and remove all piercings.  Do not wear any adhesive if you wear dentures.  Do not wear contacts; bring in glasses if needed.  Only take medications on the morning of procedure as instructed by PAT nurse per anesthesia guidelines or as instructed by physician's office.   If you are on any blood thinners reach out to the physician/surgeon's office for instructions on when/if they will need to be stopped prior to procedure.   Bring in picture ID and insurance card, advanced directive copies if applicable, CPAP/BIPAP/Inhalers if indicated morning of procedure, leave any other valuables at home.  Ensure you have arranged for someone to drive you home the day of your procedure and someone to care for you at home afterwards. It is recommended that you do not drive, drink alcohol, or make any major legal decisions for at least 24 hours after your procedure is complete.    Instructions given on hospital entrance and registration location.

## 2024-11-12 ENCOUNTER — ANESTHESIA (OUTPATIENT)
Dept: GASTROENTEROLOGY | Facility: HOSPITAL | Age: 49
End: 2024-11-12
Payer: COMMERCIAL

## 2024-11-12 ENCOUNTER — HOSPITAL ENCOUNTER (OUTPATIENT)
Facility: HOSPITAL | Age: 49
Setting detail: HOSPITAL OUTPATIENT SURGERY
Discharge: HOME | End: 2024-11-12
Attending: STUDENT IN AN ORGANIZED HEALTH CARE EDUCATION/TRAINING PROGRAM | Admitting: STUDENT IN AN ORGANIZED HEALTH CARE EDUCATION/TRAINING PROGRAM
Payer: COMMERCIAL

## 2024-11-12 ENCOUNTER — ANESTHESIA EVENT (OUTPATIENT)
Dept: GASTROENTEROLOGY | Facility: HOSPITAL | Age: 49
End: 2024-11-12
Payer: COMMERCIAL

## 2024-11-12 VITALS
BODY MASS INDEX: 37.11 KG/M2 | OXYGEN SATURATION: 98 % | SYSTOLIC BLOOD PRESSURE: 123 MMHG | DIASTOLIC BLOOD PRESSURE: 86 MMHG | HEART RATE: 93 BPM | TEMPERATURE: 97 F | RESPIRATION RATE: 16 BRPM | WEIGHT: 190 LBS

## 2024-11-12 DIAGNOSIS — Z12.11 ENCOUNTER FOR SCREENING COLONOSCOPY: ICD-10-CM

## 2024-11-12 LAB
B-HCG UR QL: NEGATIVE
EXPIRATION DATE: NORMAL
INTERNAL NEGATIVE CONTROL: NORMAL
INTERNAL POSITIVE CONTROL: NORMAL
Lab: NORMAL

## 2024-11-12 PROCEDURE — S0260 H&P FOR SURGERY: HCPCS | Performed by: STUDENT IN AN ORGANIZED HEALTH CARE EDUCATION/TRAINING PROGRAM

## 2024-11-12 PROCEDURE — 81025 URINE PREGNANCY TEST: CPT | Performed by: STUDENT IN AN ORGANIZED HEALTH CARE EDUCATION/TRAINING PROGRAM

## 2024-11-12 PROCEDURE — 25010000002 MIDAZOLAM PER 1MG: Performed by: NURSE ANESTHETIST, CERTIFIED REGISTERED

## 2024-11-12 PROCEDURE — 45385 COLONOSCOPY W/LESION REMOVAL: CPT | Performed by: STUDENT IN AN ORGANIZED HEALTH CARE EDUCATION/TRAINING PROGRAM

## 2024-11-12 PROCEDURE — 25010000002 PROPOFOL 10 MG/ML EMULSION: Performed by: NURSE ANESTHETIST, CERTIFIED REGISTERED

## 2024-11-12 RX ORDER — SODIUM CHLORIDE 0.9 % (FLUSH) 0.9 %
10 SYRINGE (ML) INJECTION EVERY 12 HOURS SCHEDULED
Status: DISCONTINUED | OUTPATIENT
Start: 2024-11-12 | End: 2024-11-12 | Stop reason: HOSPADM

## 2024-11-12 RX ORDER — SODIUM CHLORIDE, SODIUM LACTATE, POTASSIUM CHLORIDE, CALCIUM CHLORIDE 600; 310; 30; 20 MG/100ML; MG/100ML; MG/100ML; MG/100ML
50 INJECTION, SOLUTION INTRAVENOUS CONTINUOUS
Status: DISCONTINUED | OUTPATIENT
Start: 2024-11-12 | End: 2024-11-12 | Stop reason: HOSPADM

## 2024-11-12 RX ORDER — LIDOCAINE HCL/PF 100 MG/5ML
SYRINGE (ML) INJECTION AS NEEDED
Status: DISCONTINUED | OUTPATIENT
Start: 2024-11-12 | End: 2024-11-12 | Stop reason: SURG

## 2024-11-12 RX ORDER — SODIUM CHLORIDE 0.9 % (FLUSH) 0.9 %
10 SYRINGE (ML) INJECTION AS NEEDED
Status: DISCONTINUED | OUTPATIENT
Start: 2024-11-12 | End: 2024-11-12 | Stop reason: HOSPADM

## 2024-11-12 RX ORDER — PROPOFOL 10 MG/ML
VIAL (ML) INTRAVENOUS AS NEEDED
Status: DISCONTINUED | OUTPATIENT
Start: 2024-11-12 | End: 2024-11-12 | Stop reason: SURG

## 2024-11-12 RX ORDER — SODIUM CHLORIDE 9 MG/ML
40 INJECTION, SOLUTION INTRAVENOUS AS NEEDED
Status: DISCONTINUED | OUTPATIENT
Start: 2024-11-12 | End: 2024-11-12 | Stop reason: HOSPADM

## 2024-11-12 RX ORDER — MIDAZOLAM HYDROCHLORIDE 2 MG/2ML
INJECTION, SOLUTION INTRAMUSCULAR; INTRAVENOUS AS NEEDED
Status: DISCONTINUED | OUTPATIENT
Start: 2024-11-12 | End: 2024-11-12 | Stop reason: SURG

## 2024-11-12 RX ORDER — IPRATROPIUM BROMIDE AND ALBUTEROL SULFATE 2.5; .5 MG/3ML; MG/3ML
3 SOLUTION RESPIRATORY (INHALATION) ONCE AS NEEDED
Status: CANCELLED | OUTPATIENT
Start: 2024-11-12

## 2024-11-12 RX ADMIN — PROPOFOL 100 MG: 10 INJECTION, EMULSION INTRAVENOUS at 13:28

## 2024-11-12 RX ADMIN — MIDAZOLAM HYDROCHLORIDE 1 MG: 1 INJECTION, SOLUTION INTRAMUSCULAR; INTRAVENOUS at 13:28

## 2024-11-12 RX ADMIN — MIDAZOLAM HYDROCHLORIDE 1 MG: 1 INJECTION, SOLUTION INTRAMUSCULAR; INTRAVENOUS at 13:35

## 2024-11-12 RX ADMIN — Medication 60 MG: at 13:28

## 2024-11-12 RX ADMIN — PROPOFOL 75 MCG/KG/MIN: 10 INJECTION, EMULSION INTRAVENOUS at 13:28

## 2024-11-12 NOTE — ANESTHESIA PREPROCEDURE EVALUATION
Anesthesia Evaluation     Patient summary reviewed and Nursing notes reviewed   NPO Solid Status: > 8 hours  NPO Liquid Status: > 8 hours           Airway   Mallampati: II  TM distance: >3 FB  Neck ROM: full  Possible difficult intubation  Dental - normal exam     Pulmonary - normal exam   (+) a smoker Former, cigarettes, asthma,recent URI resolved  Cardiovascular - normal exam    Rhythm: regular  Rate: normal    (+) hypertension well controlled 2 medications or greater      Neuro/Psych  (+) psychiatric history Anxiety  GI/Hepatic/Renal/Endo    (+) obesity, GERD, diabetes mellitus (GESTATIONAL), thyroid problem hypothyroidism    Musculoskeletal     Abdominal  - normal exam   Substance History   (+) alcohol use     OB/GYN          Other   blood dyscrasia anemia,                 Anesthesia Plan    ASA 3     MAC   total IV anesthesia  (Risks and benefits discussed including risk of aspiration, recall and dental damage. All patient questions answered.    Will continue with plan of care.)  intravenous induction     Anesthetic plan, risks, benefits, and alternatives have been provided, discussed and informed consent has been obtained with: patient.    CODE STATUS:

## 2024-11-12 NOTE — H&P
Reason for Consultation:  Screening colonoscopy    Chief complaint :  Screening colonoscopy    SUBJECTIVE:    History of present illness:  I did see the patient today as a consultation for evaluation and treatment of a need for screening colonoscopy. Patient denies any GI complaints including hematochezia, melena, abdominal pain. She has never had a colonoscopy in the past. She has an uncle that had colon cancer, but no first degree relatives with a history.     Review of Systems:    Review of Systems - General ROS: negative for - chills, fatigue, fever, hot flashes, malaise or night sweats  Psychological ROS: negative for - behavioral disorder, disorientation, hallucinations, hostility or mood swings  ENT ROS: negative for - nasal polyps, oral lesions, sinus pain, sneezing or sore throat  Breast ROS: negative for - galactorrhea or new or changing breast lumps  Respiratory ROS: negative for - hemoptysis, orthopnea, pleuritic pain, sputum changes or stridor  Cardiovascular ROS: negative for - dyspnea on exertion, edema, irregular heartbeat, murmur, orthopnea, palpitations or rapid heart rate  Gastrointestinal ROS: negative for - change in stools, gas/bloating, hematemesis, melena or stool incontinence.  Genito-Urinary ROS: negative for - dysuria, genital ulcers, nocturia or pelvic pain  Musculoskeletal ROS: negative for - gait disturbance or muscle pain  Neurological ROS: negative for - dizziness, gait disturbance, memory loss, numbness/tingling or seizures      Allergies:  Allergies   Allergen Reactions    Triprolidine-Pseudoephedrine Rash and Unknown - Low Severity     Actifed cold medicine  age 1        Medications:    Current Facility-Administered Medications:     lactated ringers infusion, 50 mL/hr, Intravenous, Continuous, Marie, Rama B, DO    History:  Past Medical History:   Diagnosis Date    Acid reflux     occasionally - relieved with TUMS    Anxiety     Asthma     has not needed inhaler since 2022     Gestational diabetes     History of gestational diabetes     Hypertension     Hypothyroidism     Iron deficiency anemia     on supplements 24    Wears contact lenses     instructed to wear glasses DOS       Past Surgical History:   Procedure Laterality Date     SECTION       SECTION WITH TUBAL      MOUTH SURGERY      teeth extraction    TUBAL ABDOMINAL LIGATION  2016       Family History   Problem Relation Age of Onset    Hypertension Father     Melanoma Father     Diabetes Mother     Arthritis Paternal Grandmother     Heart disease Maternal Grandmother     Colon cancer Paternal Uncle     Breast cancer Neg Hx     Ovarian cancer Neg Hx     Uterine cancer Neg Hx        Social History     Tobacco Use    Smoking status: Former     Current packs/day: 0.00     Average packs/day: 0.5 packs/day for 6.0 years (3.0 ttl pk-yrs)     Types: Cigarettes     Start date: 2007     Quit date: 2013     Years since quittin.8     Passive exposure: Past    Smokeless tobacco: Never   Vaping Use    Vaping status: Never Used   Substance Use Topics    Alcohol use: Yes     Comment: occasionally    Drug use: No          OBJECTIVE:    Vital Signs   Temp:  [97.1 °F (36.2 °C)] 97.1 °F (36.2 °C)  Heart Rate:  [100] 100  Resp:  [16] 16  BP: (129)/(99) 129/99    Physical Exam:     General Appearance:    Alert, cooperative, in no acute distress   Head:    Normocephalic, without obvious abnormality, atraumatic   Eyes:            Lids and lashes normal, conjunctivae and sclerae normal, no   icterus, no pallor, corneas clear, PERRLA   Throat:   No oral lesions, no thrush, oral mucosa moist   Neck:   No adenopathy, supple, trachea midline, no thyromegaly, no   carotid bruit, no JVD   Lungs:     Clear to auscultation,respirations regular, even and                  unlabored    Heart:    Regular rhythm and normal rate, normal S1 and S2, no            murmur, no gallop, no rub, no click   Chest Wall:    No  abnormalities observed   Abdomen:     Normal bowel sounds, no masses, no organomegaly, soft        non-tender, non-distended, no guarding, there is no evidence of tenderness   Extremities:   Moves all extremities well, no edema, no cyanosis, no             redness   Skin:   No bleeding, bruising or rash   Neurologic:   Cranial nerves 2 - 12 grossly intact, sensation intact, DTR       present and equal bilaterally           ASSESSMENT/PLAN:    Screening colonoscopy      I did have a detailed and extensive discussion with the patient in the the hospital today.  The full risks and benefits of operative versus nonoperative intervention were discussed with the paient, they understand, agree, and wish to proceed with the surgical treatment plan of colonoscopy.      Rama Salazar, DO

## 2024-11-12 NOTE — ANESTHESIA POSTPROCEDURE EVALUATION
Patient: Sunitha Gregory    Procedure Summary       Date: 11/12/24 Room / Location: [facility] ENDOSCOPY 3 / [facility] ENDOSCOPY    Anesthesia Start: 1320 Anesthesia Stop: 1346    Procedure: COLONOSCOPY WITH POLYPECTOMY Diagnosis:       Encounter for screening colonoscopy      (Encounter for screening colonoscopy [Z12.11])    Surgeons: Rama Salazar DO Provider: Sally Ragsdale CRNA    Anesthesia Type: MAC ASA Status: 3            Anesthesia Type: MAC    Vitals  Vitals Value Taken Time   /86 11/12/24 1418   Temp 97 °F (36.1 °C) 11/12/24 1418   Pulse 93 11/12/24 1418   Resp 16 11/12/24 1418   SpO2 98 % 11/12/24 1418           Post Anesthesia Care and Evaluation    Patient location during evaluation: bedside  Patient participation: complete - patient participated  Level of consciousness: responsive to physical stimuli and sleepy but conscious  Pain score: 0  Pain management: satisfactory to patient    Airway patency: patent  Anesthetic complications: No anesthetic complications  PONV Status: none  Cardiovascular status: acceptable and stable  Respiratory status: acceptable  Hydration status: acceptable    Comments: Vitals signs as noted in nursing documentation as per protocol.

## 2024-11-15 LAB — REF LAB TEST METHOD: NORMAL

## 2024-11-20 ENCOUNTER — OFFICE VISIT (OUTPATIENT)
Dept: INTERNAL MEDICINE | Facility: CLINIC | Age: 49
End: 2024-11-20
Payer: COMMERCIAL

## 2024-11-20 VITALS
OXYGEN SATURATION: 98 % | WEIGHT: 190 LBS | HEIGHT: 60 IN | HEART RATE: 88 BPM | SYSTOLIC BLOOD PRESSURE: 122 MMHG | TEMPERATURE: 98 F | BODY MASS INDEX: 37.3 KG/M2 | DIASTOLIC BLOOD PRESSURE: 82 MMHG

## 2024-11-20 DIAGNOSIS — N95.1 MENOPAUSAL SYNDROME: ICD-10-CM

## 2024-11-20 DIAGNOSIS — F41.9 ANXIETY DISORDER, UNSPECIFIED TYPE: Primary | ICD-10-CM

## 2024-11-20 DIAGNOSIS — E66.812 CLASS 2 OBESITY DUE TO EXCESS CALORIES WITHOUT SERIOUS COMORBIDITY WITH BODY MASS INDEX (BMI) OF 37.0 TO 37.9 IN ADULT: ICD-10-CM

## 2024-11-20 DIAGNOSIS — E61.1 IRON DEFICIENCY: ICD-10-CM

## 2024-11-20 DIAGNOSIS — E66.09 CLASS 2 OBESITY DUE TO EXCESS CALORIES WITHOUT SERIOUS COMORBIDITY WITH BODY MASS INDEX (BMI) OF 37.0 TO 37.9 IN ADULT: ICD-10-CM

## 2024-11-20 PROBLEM — E66.01 CLASS 3 SEVERE OBESITY WITHOUT SERIOUS COMORBIDITY WITH BODY MASS INDEX (BMI) OF 40.0 TO 44.9 IN ADULT: Status: RESOLVED | Noted: 2020-09-09 | Resolved: 2024-11-20

## 2024-11-20 PROBLEM — E66.813 CLASS 3 SEVERE OBESITY WITHOUT SERIOUS COMORBIDITY WITH BODY MASS INDEX (BMI) OF 40.0 TO 44.9 IN ADULT: Status: RESOLVED | Noted: 2020-09-09 | Resolved: 2024-11-20

## 2024-11-20 PROCEDURE — 99214 OFFICE O/P EST MOD 30 MIN: CPT | Performed by: FAMILY MEDICINE

## 2024-11-20 RX ORDER — BUPROPION HYDROCHLORIDE 150 MG/1
150 TABLET ORAL DAILY
Qty: 90 TABLET | Refills: 3 | Status: SHIPPED | OUTPATIENT
Start: 2024-11-20

## 2024-11-20 NOTE — PROGRESS NOTES
Sunitha Gregory is a 49 y.o. female.    Chief Complaint   Patient presents with    Anxiety    Weight Check       HPI   History of Present Illness  The patient presents today to follow up on anxiety and obesity.    She reports a weight loss of 16 pounds, attributing it to the use of Zepbound. She has not experienced any side effects from the medication. Her current dosage is 5 mg, but she expresses a desire to increase it to 7.5 mg.    She experiences anxiety attacks at night, which she believes are not being effectively managed by Effexor. She takes Wellbutrin once daily in the morning and propranolol twice daily, once in the morning and once in the evening. Propranolol does not seem to have any effect. Her sleep is intermittent, and she has tried melatonin without success. She experiences panic attacks on some nights, which typically last for about 30 minutes. She continues to experience hot flashes and palpitations, although the latter have become less frequent. She has not experienced any side effects from Effexor, such as headaches, nausea, or migraines. She takes Effexor in the morning and reports no chest pain or shortness of breath.    She underwent a colonoscopy, during which a small polyp was removed. The results of the test were clear. She is scheduled to see Dr. Haile with gynecology again next summer. She continues to take iron supplements daily for iron deficiency.    The following portions of the patient's history were reviewed and updated as appropriate: allergies, current medications, past family history, past medical history, past social history, past surgical history and problem list.     Allergies   Allergen Reactions    Triprolidine-Pseudoephedrine Rash and Unknown - Low Severity     Actifed cold medicine  age 1          Current Outpatient Medications:     ALPRAZolam (Xanax) 0.25 MG tablet, Take 1 tablet by mouth Daily As Needed for Anxiety (30 min prior to travel)., Disp: 4 tablet, Rfl: 0     "bisacodyl (Dulcolax) 5 MG EC tablet, Take 2 tablets at 3 pm and 2 tablets at 7 pm the day prior to colonoscopy, Disp: 4 tablet, Rfl: 0    ferrous sulfate 324 (65 Fe) MG tablet delayed-release EC tablet, Take 1 tablet by mouth Daily With Breakfast., Disp: , Rfl:     hydroCHLOROthiazide 12.5 MG tablet, TAKE 1 TABLET BY MOUTH DAILY, Disp: 90 tablet, Rfl: 3    levothyroxine (SYNTHROID, LEVOTHROID) 75 MCG tablet, TAKE 1 TABLET BY MOUTH DAILY, Disp: 90 tablet, Rfl: 0    Menaquinone-7 (VITAMIN K2 PO), Take  by mouth Daily., Disp: , Rfl:     Multiple Vitamin (MULTI-VITAMIN DAILY) tablet, Take  by mouth Daily., Disp: , Rfl:     polyethylene glycol (MiraLax) 17 g packet, Mix 238 gram powder with 64 oz of clear liquid starting at 5 pm. Drink 8 oz every 10-15 minutes until consumed., Disp: 238 each, Rfl: 0    venlafaxine XR (Effexor XR) 75 MG 24 hr capsule, Take 1 capsule by mouth Daily., Disp: 90 capsule, Rfl: 3    vitamin C (ASCORBIC ACID) 250 MG tablet, Take 2,100 mg by mouth Daily., Disp: , Rfl:     buPROPion XL (Wellbutrin XL) 150 MG 24 hr tablet, Take 1 tablet by mouth Daily., Disp: 90 tablet, Rfl: 3    Tirzepatide-Weight Management (ZEPBOUND) 7.5 MG/0.5ML solution auto-injector, Inject 0.5 mL under the skin into the appropriate area as directed 1 (One) Time Per Week. Okay to compound, Disp: 2 mL, Rfl: 2    ROS    Review of Systems   Constitutional:  Negative for chills and fever.   Respiratory:  Negative for shortness of breath.    Cardiovascular:  Negative for chest pain and palpitations.   Gastrointestinal:  Negative for diarrhea, nausea and vomiting.   Endocrine: Positive for heat intolerance.   Neurological:  Negative for headache.   Psychiatric/Behavioral:  Positive for sleep disturbance and stress. Negative for depressed mood. The patient is nervous/anxious.        Vitals:    11/20/24 0845   BP: 122/82   Pulse: 88   Temp: 98 °F (36.7 °C)   SpO2: 98%   Weight: 86.2 kg (190 lb)   Height: 152.4 cm (60\")   PainSc: 0-No " pain         Physical Exam     Physical Exam  Constitutional:       General: She is not in acute distress.     Appearance: Normal appearance. She is well-developed.   HENT:      Head: Normocephalic and atraumatic.      Right Ear: External ear normal.      Left Ear: External ear normal.   Eyes:      Extraocular Movements: Extraocular movements intact.      Conjunctiva/sclera: Conjunctivae normal.   Cardiovascular:      Rate and Rhythm: Normal rate and regular rhythm.      Heart sounds: No murmur heard.  Pulmonary:      Effort: Pulmonary effort is normal. No respiratory distress.      Breath sounds: Normal breath sounds. No wheezing.   Abdominal:      General: Bowel sounds are normal. There is no distension.      Palpations: Abdomen is soft.      Tenderness: There is no abdominal tenderness.   Musculoskeletal:      Right lower leg: No edema.      Left lower leg: No edema.   Skin:     General: Skin is warm and dry.   Neurological:      Mental Status: She is alert and oriented to person, place, and time.      Cranial Nerves: No cranial nerve deficit.   Psychiatric:         Mood and Affect: Mood normal.         Behavior: Behavior normal.             Diagnoses and all orders for this visit:    1. Anxiety disorder, unspecified type (Primary)    2. Menopausal syndrome    3. Class 2 obesity due to excess calories without serious comorbidity with body mass index (BMI) of 37.0 to 37.9 in adult    4. Iron deficiency    Other orders  -     Tirzepatide-Weight Management (ZEPBOUND) 7.5 MG/0.5ML solution auto-injector; Inject 0.5 mL under the skin into the appropriate area as directed 1 (One) Time Per Week. Okay to compound  Dispense: 2 mL; Refill: 2  -     buPROPion XL (Wellbutrin XL) 150 MG 24 hr tablet; Take 1 tablet by mouth Daily.  Dispense: 90 tablet; Refill: 3        Assessment & Plan  1. Anxiety.  Her anxiety remains uncontrolled with current medications, and she does not feel any improvement with propranolol. Propranolol  will be discontinued. She will continue the current dosage of Effexor. Wellbutrin will be changed to a long-acting extended-release formulation to help manage her symptoms in the evening. If she experiences palpitations after stopping propranolol, she should notify the clinic.    2. Menopausal syndrome.  She will continue with Effexor. If menopausal symptoms persist, an increase in Effexor dosage may be considered. Wellbutrin may also cause hot flashes, but it is more likely to cause symptoms at night.    3. Obesity.  Her condition has improved with Zepbound, although she feels her progress has plateaued. The dosage of Zepbound will be increased to 7.5 mg weekly and [facility] [place]. She has lost 16 pounds so far and is not experiencing any side effects from the medication.    4. Iron deficiency.  She will continue taking iron supplements. Iron studies will be rechecked in a couple of months to monitor her ferritin levels.         Follow-up  Return in 3 months for follow up.    New Medications Ordered This Visit   Medications    Tirzepatide-Weight Management (ZEPBOUND) 7.5 MG/0.5ML solution auto-injector     Sig: Inject 0.5 mL under the skin into the appropriate area as directed 1 (One) Time Per Week. Okay to compound     Dispense:  2 mL     Refill:  2    buPROPion XL (Wellbutrin XL) 150 MG 24 hr tablet     Sig: Take 1 tablet by mouth Daily.     Dispense:  90 tablet     Refill:  3       No orders of the defined types were placed in this encounter.      Return in about 3 months (around 2/20/2025) for anxiety, weight check.    Aliyah Cardoza DO

## 2024-12-26 RX ORDER — LEVOTHYROXINE SODIUM 75 UG/1
75 TABLET ORAL DAILY
Qty: 90 TABLET | Refills: 0 | Status: SHIPPED | OUTPATIENT
Start: 2024-12-26

## 2025-01-04 ENCOUNTER — TELEPHONE (OUTPATIENT)
Dept: INTERNAL MEDICINE | Facility: CLINIC | Age: 50
End: 2025-01-04
Payer: COMMERCIAL

## 2025-01-04 RX ORDER — CIPROFLOXACIN 500 MG/1
500 TABLET, FILM COATED ORAL 2 TIMES DAILY
Qty: 14 TABLET | Refills: 0 | Status: SHIPPED | OUTPATIENT
Start: 2025-01-04

## 2025-01-04 RX ORDER — FLUCONAZOLE 150 MG/1
150 TABLET ORAL
Qty: 3 TABLET | Refills: 0 | Status: SHIPPED | OUTPATIENT
Start: 2025-01-04

## 2025-01-04 NOTE — TELEPHONE ENCOUNTER
Sunitha contacted me regarding a UTI symptoms.  She reported previously having taken augmentin and had a horrible yeast infection from the antibiotic.  Cipro sent to the pharmacy, along with 3 doses of diflucan if needed.

## 2025-02-22 LAB
FERRITIN SERPL-MCNC: 50.6 NG/ML (ref 13–150)
IRON SATN MFR SERPL: 22 % (ref 20–50)
IRON SERPL-MCNC: 76 MCG/DL (ref 37–145)
TIBC SERPL-MCNC: 341 MCG/DL
UIBC SERPL-MCNC: 265 MCG/DL (ref 112–346)

## 2025-02-24 ENCOUNTER — OFFICE VISIT (OUTPATIENT)
Dept: INTERNAL MEDICINE | Facility: CLINIC | Age: 50
End: 2025-02-24
Payer: COMMERCIAL

## 2025-02-24 VITALS
DIASTOLIC BLOOD PRESSURE: 86 MMHG | SYSTOLIC BLOOD PRESSURE: 128 MMHG | WEIGHT: 166 LBS | HEART RATE: 88 BPM | OXYGEN SATURATION: 100 % | HEIGHT: 60 IN | TEMPERATURE: 98 F | BODY MASS INDEX: 32.59 KG/M2

## 2025-02-24 DIAGNOSIS — F41.9 ANXIETY DISORDER, UNSPECIFIED TYPE: ICD-10-CM

## 2025-02-24 DIAGNOSIS — E66.811 CLASS 1 OBESITY DUE TO EXCESS CALORIES WITHOUT SERIOUS COMORBIDITY WITH BODY MASS INDEX (BMI) OF 32.0 TO 32.9 IN ADULT: Primary | ICD-10-CM

## 2025-02-24 DIAGNOSIS — E66.09 CLASS 1 OBESITY DUE TO EXCESS CALORIES WITHOUT SERIOUS COMORBIDITY WITH BODY MASS INDEX (BMI) OF 32.0 TO 32.9 IN ADULT: Primary | ICD-10-CM

## 2025-02-24 DIAGNOSIS — N95.1 MENOPAUSAL SYNDROME: ICD-10-CM

## 2025-02-24 DIAGNOSIS — E03.9 ACQUIRED HYPOTHYROIDISM: ICD-10-CM

## 2025-02-24 PROBLEM — J02.9 ACUTE PHARYNGITIS: Status: RESOLVED | Noted: 2019-06-06 | Resolved: 2025-02-24

## 2025-02-24 PROCEDURE — 99214 OFFICE O/P EST MOD 30 MIN: CPT | Performed by: FAMILY MEDICINE

## 2025-02-24 RX ORDER — PROPRANOLOL HYDROCHLORIDE 10 MG/1
10 TABLET ORAL
COMMUNITY
Start: 2024-12-20

## 2025-02-24 NOTE — PROGRESS NOTES
Sunitha Gregory is a 49 y.o. female.    Chief Complaint   Patient presents with    Anxiety    Weight Check       HPI   History of Present Illness  The patient presents for a weight check and anxiety.    She has lost 40 pounds, targeting 140 pounds. No adverse effects from compounded Zepbound, except mild nausea after the initial injection, subsiding the next day. No physical exercise currently but plans to start. Maintains a high-protein diet, recently added clear protein drinks, causing gastrointestinal discomfort and diarrhea twice. No issues with the drink for the first 3 days. Takes a daily multivitamin.    Anxiety improved with Effexor, but experienced 3 severe anxiety attacks at night last week. No specific triggers identified, mind feels overworked and restless. Takes propranolol at bedtime with vitamins, beneficial in the past. No changes to regimen. No chest pain, shortness of breath, or palpitations during panic attacks. No feelings of sadness, hopelessness, or worthlessness.    Continues to experience hot flashes, with a recent episode at 2:00 AM. Recently had a severe episode of lightheadedness lasting several minutes, unlike usual brief episodes. Occurred on a Saturday, typically administers injection on Tuesdays. Admits to inadequate water intake.          The following portions of the patient's history were reviewed and updated as appropriate: allergies, current medications, past family history, past medical history, past social history, past surgical history and problem list.     Allergies   Allergen Reactions    Triprolidine-Pseudoephedrine Rash and Unknown - Low Severity     Actifed cold medicine  age 1          Current Outpatient Medications:     ALPRAZolam (Xanax) 0.25 MG tablet, Take 1 tablet by mouth Daily As Needed for Anxiety (30 min prior to travel)., Disp: 4 tablet, Rfl: 0    buPROPion XL (Wellbutrin XL) 150 MG 24 hr tablet, Take 1 tablet by mouth Daily., Disp: 90 tablet, Rfl: 3    ferrous  "sulfate 324 (65 Fe) MG tablet delayed-release EC tablet, Take 1 tablet by mouth Daily With Breakfast., Disp: , Rfl:     hydroCHLOROthiazide 12.5 MG tablet, TAKE 1 TABLET BY MOUTH DAILY, Disp: 90 tablet, Rfl: 3    levothyroxine (SYNTHROID, LEVOTHROID) 75 MCG tablet, TAKE 1 TABLET BY MOUTH DAILY, Disp: 90 tablet, Rfl: 0    Menaquinone-7 (VITAMIN K2 PO), Take  by mouth Daily., Disp: , Rfl:     Multiple Vitamin (MULTI-VITAMIN DAILY) tablet, Take  by mouth Daily., Disp: , Rfl:     propranolol (INDERAL) 10 MG tablet, Take 1 tablet by mouth., Disp: , Rfl:     Tirzepatide-Weight Management (ZEPBOUND) 10 MG/0.5ML solution auto-injector, Inject 0.5 mL under the skin into the appropriate area as directed 1 (One) Time Per Week. Okay to compound, Disp: 2 mL, Rfl: 2    venlafaxine XR (Effexor XR) 75 MG 24 hr capsule, Take 1 capsule by mouth Daily., Disp: 90 capsule, Rfl: 3    vitamin C (ASCORBIC ACID) 250 MG tablet, Take 2,100 mg by mouth Daily., Disp: , Rfl:     ROS    Review of Systems   Constitutional:  Negative for chills and fever.   Respiratory:  Negative for shortness of breath.    Cardiovascular:  Negative for chest pain and palpitations.   Gastrointestinal:  Positive for diarrhea and nausea.   Endocrine: Positive for heat intolerance.   Psychiatric/Behavioral:  Negative for depressed mood. The patient is nervous/anxious (occasional panic attacks).        Vitals:    02/24/25 0831   BP: 128/86   Pulse: 88   Temp: 98 °F (36.7 °C)   SpO2: 100%   Weight: 75.3 kg (166 lb)   Height: 152.4 cm (60\")   PainSc: 0-No pain     Body mass index is 32.42 kg/m².      Physical Exam     Physical Exam  Constitutional:       General: She is not in acute distress.     Appearance: Normal appearance. She is well-developed.   HENT:      Head: Normocephalic and atraumatic.      Right Ear: Tympanic membrane and external ear normal.      Left Ear: Tympanic membrane and external ear normal.      Mouth/Throat:      Pharynx: No posterior oropharyngeal " erythema.   Eyes:      Extraocular Movements: Extraocular movements intact.      Conjunctiva/sclera: Conjunctivae normal.      Pupils: Pupils are equal, round, and reactive to light.   Cardiovascular:      Rate and Rhythm: Normal rate and regular rhythm.      Heart sounds: No murmur heard.  Pulmonary:      Effort: Pulmonary effort is normal. No respiratory distress.      Breath sounds: Normal breath sounds. No wheezing.   Abdominal:      General: Bowel sounds are normal. There is no distension.      Palpations: Abdomen is soft.      Tenderness: There is no abdominal tenderness.   Musculoskeletal:      Cervical back: Neck supple.      Right lower leg: No edema.      Left lower leg: No edema.   Lymphadenopathy:      Cervical: No cervical adenopathy.   Skin:     General: Skin is warm and dry.   Neurological:      Mental Status: She is alert and oriented to person, place, and time.      Cranial Nerves: No cranial nerve deficit.      Deep Tendon Reflexes: Reflexes normal.   Psychiatric:         Mood and Affect: Mood normal.         Behavior: Behavior normal.           Diagnoses and all orders for this visit:    1. Class 1 obesity due to excess calories without serious comorbidity with body mass index (BMI) of 32.0 to 32.9 in adult (Primary)    2. Anxiety disorder, unspecified type    3. Acquired hypothyroidism  -     TSH  -     T4, Free    4. Menopausal syndrome        Assessment & Plan  1. Weight management.  Lost 40 pounds, goal weight 140 pounds. Continue Zepbound, increase aerobic activity, maintain healthy diet. Plan to reduce medication dosage to maintenance level upon reaching goal weight, eventually discontinue if weight is sustained. Increase water intake    2. Anxiety.  Improved with Effexor and Wellbutrin, supplemented by propranolol. Increased panic attacks over the past week. Obtain updated thyroid studies to assess need for thyroid medication adjustment.    3. Hypothyroidism.  Obtain updated thyroid  studies. Adjust levothyroxine dosage if necessary.        Follow-up in 6 months.    No orders of the defined types were placed in this encounter.      No orders of the defined types were placed in this encounter.      Return in about 6 months (around 8/24/2025) for Annual.    Aliyah Cardoza,

## 2025-03-08 LAB
T4 FREE SERPL-MCNC: 1.47 NG/DL (ref 0.92–1.68)
TSH SERPL DL<=0.005 MIU/L-ACNC: 1.23 UIU/ML (ref 0.27–4.2)

## 2025-03-25 RX ORDER — LEVOTHYROXINE SODIUM 75 UG/1
75 TABLET ORAL DAILY
Qty: 90 TABLET | Refills: 3 | Status: SHIPPED | OUTPATIENT
Start: 2025-03-25

## 2025-03-25 RX ORDER — PROPRANOLOL HYDROCHLORIDE 10 MG/1
10 TABLET ORAL 2 TIMES DAILY PRN
Qty: 180 TABLET | Refills: 3 | Status: SHIPPED | OUTPATIENT
Start: 2025-03-25

## 2025-03-25 NOTE — TELEPHONE ENCOUNTER
Rx Refill Note  Last RX historical provider, do you want to manage  Requested Prescriptions     Pending Prescriptions Disp Refills    propranolol (INDERAL) 10 MG tablet [Pharmacy Med Name: PROPRANOLOL 10 MG TABLET] 180 tablet      Sig: TAKE 1 TABLET BY MOUTH 2 TIMES A DAY      Last office visit with prescribing clinician: 2/24/2025   Last telemedicine visit with prescribing clinician: Visit date not found   Next office visit with prescribing clinician: 9/24/2025                         Would you like a call back once the refill request has been completed: [] Yes [] No    If the office needs to give you a call back, can they leave a voicemail: [] Yes [] No    Luke Parsons, YANICK  03/25/25, 08:50 EDT

## 2025-04-15 NOTE — PROGRESS NOTES
Patient reported the 1mg of semaglutide does not seem effective for weight loss and has gained 3lbs since switching from tirzepetide a month ago.  Will increase to 2mg weekly.

## 2025-05-06 ENCOUNTER — HOSPITAL ENCOUNTER (OUTPATIENT)
Dept: MAMMOGRAPHY | Facility: HOSPITAL | Age: 50
Discharge: HOME | End: 2025-05-06
Admitting: OBSTETRICS & GYNECOLOGY
Payer: COMMERCIAL

## 2025-05-06 DIAGNOSIS — Z12.31 BREAST CANCER SCREENING BY MAMMOGRAM: ICD-10-CM

## 2025-05-06 PROCEDURE — 77067 SCR MAMMO BI INCL CAD: CPT

## 2025-05-06 PROCEDURE — 77063 BREAST TOMOSYNTHESIS BI: CPT

## 2025-05-14 ENCOUNTER — DOCUMENTATION (OUTPATIENT)
Dept: INTERNAL MEDICINE | Facility: CLINIC | Age: 50
End: 2025-05-14
Payer: COMMERCIAL

## 2025-06-13 RX ORDER — TIRZEPATIDE 10 MG/.5ML
INJECTION, SOLUTION SUBCUTANEOUS
Qty: 2 ML | OUTPATIENT
Start: 2025-06-13

## 2025-07-09 NOTE — TELEPHONE ENCOUNTER
Rx Refill Note  Requested Prescriptions     Pending Prescriptions Disp Refills    Tirzepatide-Weight Management (ZEPBOUND) 10 MG/0.5ML solution 2 mL 2     Sig: Inject 0.5 mL under the skin into the appropriate area as directed 1 (One) Time Per Week.      Last office visit with prescribing clinician: 2/24/2025   Last telemedicine visit with prescribing clinician: Visit date not found   Next office visit with prescribing clinician: 9/24/2025                         Would you like a call back once the refill request has been completed: [] Yes [] No    If the office needs to give you a call back, can they leave a voicemail: [] Yes [] No    Papa Miguel MA  07/09/25, 10:05 EDT

## (undated) DEVICE — GLV SURG SENSICARE W/ALOE PF LF 6.5 STRL

## (undated) DEVICE — SINGLE-USE POLYPECTOMY SNARE: Brand: CAPTIVATOR II

## (undated) DEVICE — QUICK CATCH IN-LINE SUCTION POLYP TRAP IS USED FOR SUCTION RETRIEVAL OF ENDOSCOPICALLY REMOVED POLYPS.

## (undated) DEVICE — Device

## (undated) DEVICE — PATIENT RETURN ELECTRODE, SINGLE-USE, CONTACT QUALITY MONITORING, ADULT, WITH 9FT CORD, FOR PATIENTS WEIGING OVER 33LBS. (15KG): Brand: MEGADYNE

## (undated) DEVICE — MEDI-VAC NON-CONDUCTIVE SUCTION TUBING: Brand: CARDINAL HEALTH

## (undated) DEVICE — LUBE JELLY PK/2.75GM STRL BX/144

## (undated) DEVICE — VLV SXN AIR/H2O ORCAPOD3 1P/U STRL